# Patient Record
Sex: FEMALE | Race: WHITE | NOT HISPANIC OR LATINO | Employment: FULL TIME | URBAN - METROPOLITAN AREA
[De-identification: names, ages, dates, MRNs, and addresses within clinical notes are randomized per-mention and may not be internally consistent; named-entity substitution may affect disease eponyms.]

---

## 2023-02-27 LAB — SARS-COV-2 RESULT: DETECTED

## 2023-07-12 PROBLEM — R32 INCONTINENCE: Status: ACTIVE | Noted: 2023-07-12

## 2023-07-12 PROBLEM — I10 ESSENTIAL HYPERTENSION: Status: ACTIVE | Noted: 2023-07-12

## 2023-07-12 PROBLEM — M54.2 NECK PAIN: Status: ACTIVE | Noted: 2023-07-12

## 2023-07-12 PROBLEM — M17.12 ARTHRITIS OF KNEE, LEFT: Status: ACTIVE | Noted: 2023-07-12

## 2023-07-12 PROBLEM — Z86.718 HISTORY OF BLOOD CLOTS: Status: ACTIVE | Noted: 2023-07-12

## 2023-07-12 PROBLEM — K80.20 CHOLELITHIASIS: Status: ACTIVE | Noted: 2023-07-12

## 2023-07-12 PROBLEM — D50.9 IRON DEFICIENCY ANEMIA: Status: ACTIVE | Noted: 2023-07-12

## 2023-07-12 PROBLEM — R00.2 PALPITATIONS: Status: ACTIVE | Noted: 2023-07-12

## 2023-07-12 PROBLEM — M22.2X2 PATELLOFEMORAL DISORDER OF BOTH KNEES: Status: ACTIVE | Noted: 2023-07-12

## 2023-07-12 PROBLEM — K21.9 GERD (GASTROESOPHAGEAL REFLUX DISEASE): Status: ACTIVE | Noted: 2023-07-12

## 2023-07-12 PROBLEM — R07.9 CHEST PAIN: Status: ACTIVE | Noted: 2023-07-12

## 2023-07-12 PROBLEM — J45.909 ASTHMATIC BRONCHITIS (HHS-HCC): Status: ACTIVE | Noted: 2023-07-12

## 2023-07-12 PROBLEM — G47.30 SLEEP APNEA: Status: ACTIVE | Noted: 2023-07-12

## 2023-07-12 PROBLEM — R94.31 ABNORMAL ECG: Status: ACTIVE | Noted: 2023-07-12

## 2023-07-12 PROBLEM — M19.90 ARTHRITIS: Status: ACTIVE | Noted: 2023-07-12

## 2023-07-12 PROBLEM — M25.562 PAIN IN BOTH KNEES: Status: ACTIVE | Noted: 2023-07-12

## 2023-07-12 PROBLEM — A63.0 VENEREAL WARTS IN FEMALE: Status: ACTIVE | Noted: 2023-07-12

## 2023-07-12 PROBLEM — M22.2X1 PATELLOFEMORAL DISORDER OF BOTH KNEES: Status: ACTIVE | Noted: 2023-07-12

## 2023-07-12 PROBLEM — B35.9 TINEA: Status: ACTIVE | Noted: 2023-07-12

## 2023-07-12 PROBLEM — M25.562 LEFT KNEE PAIN: Status: ACTIVE | Noted: 2023-07-12

## 2023-07-12 PROBLEM — M54.12 CERVICAL RADICULOPATHY: Status: ACTIVE | Noted: 2023-07-12

## 2023-07-12 PROBLEM — I26.99 PULMONARY EMBOLISM (MULTI): Status: ACTIVE | Noted: 2023-07-12

## 2023-07-12 PROBLEM — M54.9 BACK PAIN: Status: ACTIVE | Noted: 2023-07-12

## 2023-07-12 PROBLEM — E66.01 OBESITIES, MORBID (MULTI): Status: ACTIVE | Noted: 2023-07-12

## 2023-07-12 PROBLEM — Z98.84 S/P LAPAROSCOPIC SLEEVE GASTRECTOMY: Status: ACTIVE | Noted: 2023-07-12

## 2023-07-12 PROBLEM — M17.11 ARTHRITIS OF KNEE, RIGHT: Status: ACTIVE | Noted: 2023-07-12

## 2023-07-12 PROBLEM — E66.811 OBESITY (BMI 30.0-34.9): Status: ACTIVE | Noted: 2023-07-12

## 2023-07-12 PROBLEM — F50.9 EATING DISORDER: Status: ACTIVE | Noted: 2023-07-12

## 2023-07-12 PROBLEM — M79.2 NERVE PAIN: Status: ACTIVE | Noted: 2023-07-12

## 2023-07-12 PROBLEM — M25.561 PAIN IN BOTH KNEES: Status: ACTIVE | Noted: 2023-07-12

## 2023-07-12 PROBLEM — M17.12 LEFT KNEE DJD: Status: ACTIVE | Noted: 2023-07-12

## 2023-07-12 PROBLEM — M17.9 DJD (DEGENERATIVE JOINT DISEASE) OF KNEE: Status: ACTIVE | Noted: 2023-07-12

## 2023-07-12 PROBLEM — R63.4 RAPID WEIGHT LOSS: Status: ACTIVE | Noted: 2023-07-12

## 2023-07-12 PROBLEM — J30.9 ALLERGIC RHINITIS: Status: ACTIVE | Noted: 2023-07-12
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
## 2023-07-12 PROBLEM — E66.9 OBESITY (BMI 30.0-34.9): Status: ACTIVE | Noted: 2023-07-12

## 2023-07-12 PROBLEM — R56.9 CONVULSION DISORDER (MULTI): Status: ACTIVE | Noted: 2023-07-12

## 2023-07-12 PROBLEM — H53.9 VISUAL DISTURBANCES: Status: ACTIVE | Noted: 2023-07-12

## 2023-07-12 PROBLEM — G40.909 SEIZURE DISORDER (MULTI): Status: ACTIVE | Noted: 2023-07-12

## 2023-07-12 PROBLEM — R53.83 FATIGUE: Status: ACTIVE | Noted: 2023-07-12

## 2023-07-12 PROBLEM — M17.0 PRIMARY OSTEOARTHRITIS OF BOTH KNEES: Status: ACTIVE | Noted: 2023-07-12

## 2023-07-12 RX ORDER — LOSARTAN POTASSIUM 50 MG/1
1 TABLET ORAL DAILY
COMMUNITY
Start: 2022-10-06 | End: 2023-07-13 | Stop reason: SDUPTHER

## 2023-07-12 RX ORDER — ALBUTEROL SULFATE 90 UG/1
2 AEROSOL, METERED RESPIRATORY (INHALATION) 4 TIMES DAILY
COMMUNITY
Start: 2019-09-13 | End: 2023-07-13 | Stop reason: SDUPTHER

## 2023-07-12 RX ORDER — FLUTICASONE PROPIONATE AND SALMETEROL 250; 50 UG/1; UG/1
POWDER RESPIRATORY (INHALATION)
COMMUNITY
Start: 2019-09-13

## 2023-07-12 RX ORDER — CETIRIZINE HYDROCHLORIDE 10 MG/1
1 TABLET ORAL DAILY
COMMUNITY

## 2023-07-12 RX ORDER — LEVONORGESTREL 52 MG/1
INTRAUTERINE DEVICE INTRAUTERINE
COMMUNITY
Start: 2017-08-23

## 2023-07-13 ENCOUNTER — OFFICE VISIT (OUTPATIENT)
Dept: PRIMARY CARE | Facility: CLINIC | Age: 48
End: 2023-07-13
Payer: COMMERCIAL

## 2023-07-13 VITALS
TEMPERATURE: 98 F | DIASTOLIC BLOOD PRESSURE: 78 MMHG | SYSTOLIC BLOOD PRESSURE: 118 MMHG | RESPIRATION RATE: 16 BRPM | HEART RATE: 60 BPM | OXYGEN SATURATION: 98 % | BODY MASS INDEX: 31.71 KG/M2 | WEIGHT: 179 LBS | HEIGHT: 63 IN

## 2023-07-13 DIAGNOSIS — Z12.11 SCREENING FOR COLON CANCER: Primary | ICD-10-CM

## 2023-07-13 DIAGNOSIS — J45.20 MILD INTERMITTENT ASTHMATIC BRONCHITIS WITHOUT COMPLICATION (HHS-HCC): ICD-10-CM

## 2023-07-13 DIAGNOSIS — D64.9 ANEMIA, UNSPECIFIED TYPE: ICD-10-CM

## 2023-07-13 DIAGNOSIS — L98.9 SKIN LESION: ICD-10-CM

## 2023-07-13 DIAGNOSIS — Z12.31 ENCOUNTER FOR SCREENING MAMMOGRAM FOR MALIGNANT NEOPLASM OF BREAST: ICD-10-CM

## 2023-07-13 DIAGNOSIS — Z00.00 HEALTH CARE MAINTENANCE: ICD-10-CM

## 2023-07-13 DIAGNOSIS — I10 ESSENTIAL HYPERTENSION: ICD-10-CM

## 2023-07-13 PROBLEM — M25.561 PAIN IN BOTH KNEES: Status: RESOLVED | Noted: 2023-07-12 | Resolved: 2023-07-13

## 2023-07-13 PROBLEM — B35.9 TINEA: Status: RESOLVED | Noted: 2023-07-12 | Resolved: 2023-07-13

## 2023-07-13 PROBLEM — M25.562 PAIN IN BOTH KNEES: Status: RESOLVED | Noted: 2023-07-12 | Resolved: 2023-07-13

## 2023-07-13 PROBLEM — M54.2 NECK PAIN: Status: RESOLVED | Noted: 2023-07-12 | Resolved: 2023-07-13

## 2023-07-13 PROBLEM — M22.2X2 PATELLOFEMORAL DISORDER OF BOTH KNEES: Status: RESOLVED | Noted: 2023-07-12 | Resolved: 2023-07-13

## 2023-07-13 PROBLEM — M25.562 LEFT KNEE PAIN: Status: RESOLVED | Noted: 2023-07-12 | Resolved: 2023-07-13

## 2023-07-13 PROBLEM — R32 INCONTINENCE: Status: RESOLVED | Noted: 2023-07-12 | Resolved: 2023-07-13

## 2023-07-13 PROBLEM — R94.31 ABNORMAL ECG: Status: RESOLVED | Noted: 2023-07-12 | Resolved: 2023-07-13

## 2023-07-13 PROBLEM — G40.909 SEIZURE DISORDER (MULTI): Status: RESOLVED | Noted: 2023-07-12 | Resolved: 2023-07-13

## 2023-07-13 PROBLEM — M17.0 PRIMARY OSTEOARTHRITIS OF BOTH KNEES: Status: RESOLVED | Noted: 2023-07-12 | Resolved: 2023-07-13

## 2023-07-13 PROBLEM — R00.2 PALPITATIONS: Status: RESOLVED | Noted: 2023-07-12 | Resolved: 2023-07-13

## 2023-07-13 PROBLEM — M17.9 DJD (DEGENERATIVE JOINT DISEASE) OF KNEE: Status: RESOLVED | Noted: 2023-07-12 | Resolved: 2023-07-13

## 2023-07-13 PROBLEM — M17.12 LEFT KNEE DJD: Status: RESOLVED | Noted: 2023-07-12 | Resolved: 2023-07-13
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
## 2023-07-13 PROBLEM — R63.4 RAPID WEIGHT LOSS: Status: RESOLVED | Noted: 2023-07-12 | Resolved: 2023-07-13

## 2023-07-13 PROBLEM — R56.9 CONVULSION DISORDER (MULTI): Status: RESOLVED | Noted: 2023-07-12 | Resolved: 2023-07-13

## 2023-07-13 PROBLEM — M54.9 BACK PAIN: Status: RESOLVED | Noted: 2023-07-12 | Resolved: 2023-07-13

## 2023-07-13 PROBLEM — M17.12 ARTHRITIS OF KNEE, LEFT: Status: RESOLVED | Noted: 2023-07-12 | Resolved: 2023-07-13

## 2023-07-13 PROBLEM — R53.83 FATIGUE: Status: RESOLVED | Noted: 2023-07-12 | Resolved: 2023-07-13

## 2023-07-13 PROBLEM — H53.9 VISUAL DISTURBANCES: Status: RESOLVED | Noted: 2023-07-12 | Resolved: 2023-07-13

## 2023-07-13 PROBLEM — M22.2X1 PATELLOFEMORAL DISORDER OF BOTH KNEES: Status: RESOLVED | Noted: 2023-07-12 | Resolved: 2023-07-13

## 2023-07-13 PROBLEM — F50.9 EATING DISORDER: Status: RESOLVED | Noted: 2023-07-12 | Resolved: 2023-07-13

## 2023-07-13 PROBLEM — M54.12 CERVICAL RADICULOPATHY: Status: RESOLVED | Noted: 2023-07-12 | Resolved: 2023-07-13

## 2023-07-13 PROBLEM — M79.2 NERVE PAIN: Status: RESOLVED | Noted: 2023-07-12 | Resolved: 2023-07-13

## 2023-07-13 PROBLEM — M17.11 ARTHRITIS OF KNEE, RIGHT: Status: RESOLVED | Noted: 2023-07-12 | Resolved: 2023-07-13

## 2023-07-13 PROBLEM — R07.9 CHEST PAIN: Status: RESOLVED | Noted: 2023-07-12 | Resolved: 2023-07-13

## 2023-07-13 PROCEDURE — 3078F DIAST BP <80 MM HG: CPT | Performed by: INTERNAL MEDICINE

## 2023-07-13 PROCEDURE — 3074F SYST BP LT 130 MM HG: CPT | Performed by: INTERNAL MEDICINE

## 2023-07-13 PROCEDURE — 99396 PREV VISIT EST AGE 40-64: CPT | Performed by: INTERNAL MEDICINE

## 2023-07-13 PROCEDURE — 1036F TOBACCO NON-USER: CPT | Performed by: INTERNAL MEDICINE

## 2023-07-13 RX ORDER — ALBUTEROL SULFATE 90 UG/1
2 AEROSOL, METERED RESPIRATORY (INHALATION) EVERY 4 HOURS PRN
Qty: 18 G | Refills: 3 | Status: SHIPPED | OUTPATIENT
Start: 2023-07-13 | End: 2023-07-13

## 2023-07-13 RX ORDER — MULTIVITAMIN
1 TABLET ORAL DAILY
COMMUNITY

## 2023-07-13 RX ORDER — LOSARTAN POTASSIUM 50 MG/1
50 TABLET ORAL DAILY
Qty: 90 TABLET | Refills: 3 | Status: SHIPPED | OUTPATIENT
Start: 2023-07-13 | End: 2023-07-13

## 2023-07-13 ASSESSMENT — ENCOUNTER SYMPTOMS
EYE PAIN: 0
BACK PAIN: 0
SHORTNESS OF BREATH: 0
EYE REDNESS: 0
CONSTIPATION: 0
FREQUENCY: 0
FEVER: 0
NAUSEA: 0
UNEXPECTED WEIGHT CHANGE: 0
COUGH: 0
FATIGUE: 0
WEAKNESS: 0
DIARRHEA: 0
HEADACHES: 0
RHINORRHEA: 0
ARTHRALGIAS: 0
ABDOMINAL PAIN: 0
PALPITATIONS: 0
WHEEZING: 0
PSYCHIATRIC NEGATIVE: 1
DIFFICULTY URINATING: 0
DYSURIA: 0
EYE ITCHING: 0
SORE THROAT: 0

## 2023-07-13 ASSESSMENT — PATIENT HEALTH QUESTIONNAIRE - PHQ9
SUM OF ALL RESPONSES TO PHQ9 QUESTIONS 1 AND 2: 0
1. LITTLE INTEREST OR PLEASURE IN DOING THINGS: NOT AT ALL
2. FEELING DOWN, DEPRESSED OR HOPELESS: NOT AT ALL

## 2023-07-13 ASSESSMENT — PAIN SCALES - GENERAL: PAINLEVEL: 4

## 2023-07-13 NOTE — PROGRESS NOTES
"Melissa Fuentes is a 48 y.o. female who presents for Annual Exam.    HPI   Influenza 2022  Covid 2021  Tdap 2019  Mammogram due  Pap GYN  Colonoscopy due  Bmi 31  Eye exam  23  Depression screen 23    Review of Systems   Constitutional:  Negative for fatigue, fever and unexpected weight change.   HENT:  Negative for congestion, ear pain, rhinorrhea and sore throat.    Eyes:  Negative for pain, redness and itching.   Respiratory:  Negative for cough, shortness of breath and wheezing.    Cardiovascular:  Negative for chest pain, palpitations and leg swelling.   Gastrointestinal:  Negative for abdominal pain, constipation, diarrhea and nausea.   Genitourinary:  Negative for difficulty urinating, dysuria and frequency.   Musculoskeletal:  Negative for arthralgias and back pain.   Allergic/Immunologic: Negative for environmental allergies, food allergies and immunocompromised state.   Neurological:  Negative for weakness and headaches.   Psychiatric/Behavioral: Negative.     All other systems reviewed and are negative.      Health Maintenance Due   Topic Date Due    Yearly Adult Physical  Never done    HIV Screening  Never done    Colorectal Cancer Screening  Never done    MMR Vaccines (1 of 1 - Standard series) Never done    Hepatitis C Screening  Never done    Diabetes Screening  Never done    Cervical Cancer Screening  Never done    Mammogram  Never done    COVID-19 Vaccine (4 - Booster for Moderna series) 01/04/2022       Objective   /78   Pulse 60   Temp 36.7 °C (98 °F)   Resp 16   Ht 1.6 m (5' 3\")   Wt 81.2 kg (179 lb)   SpO2 98%   BMI 31.71 kg/m²     Physical Exam  Vitals and nursing note reviewed.   Constitutional:       Appearance: Normal appearance.   HENT:      Head: Normocephalic.   Eyes:      Conjunctiva/sclera: Conjunctivae normal.      Pupils: Pupils are equal, round, and reactive to light.   Cardiovascular:      Rate and Rhythm: Normal rate and regular rhythm.      Pulses: Normal " pulses.      Heart sounds: Normal heart sounds.   Pulmonary:      Effort: Pulmonary effort is normal.      Breath sounds: Normal breath sounds.   Musculoskeletal:         General: No swelling.      Cervical back: Neck supple.   Skin:     General: Skin is warm and dry.   Neurological:      General: No focal deficit present.      Mental Status: She is oriented to person, place, and time.         Assessment/Plan   Problem List Items Addressed This Visit       Asthmatic bronchitis    Relevant Medications    albuterol 90 mcg/actuation inhaler    Essential hypertension    Relevant Medications    losartan (Cozaar) 50 mg tablet     Other Visit Diagnoses       Screening for colon cancer    -  Primary    Relevant Orders    Cologuard® colon cancer screening    Health care maintenance        Relevant Orders    Comprehensive Metabolic Panel    Lipid Panel    Thyroid Stimulating Hormone    Vitamin D, Total    Magnesium    CBC    Vitamin B12    Encounter for screening mammogram for malignant neoplasm of breast        Relevant Orders    BI mammo bilateral screening tomosynthesis    Anemia, unspecified type        Relevant Orders    Iron and TIBC    Ferritin    Skin lesion                Refer to derm      Yes

## 2023-08-01 LAB — NONINV COLON CA DNA+OCC BLD SCRN STL QL: NEGATIVE

## 2023-08-02 ENCOUNTER — TELEPHONE (OUTPATIENT)
Dept: PRIMARY CARE | Facility: CLINIC | Age: 48
End: 2023-08-02
Payer: COMMERCIAL

## 2023-08-02 NOTE — TELEPHONE ENCOUNTER
----- Message from Tammie Russell DO sent at 8/2/2023  8:37 AM EDT -----  Call patient colednahenriquerd is negative  Recheck in 3 years

## 2023-08-15 ENCOUNTER — LAB (OUTPATIENT)
Dept: LAB | Facility: LAB | Age: 48
End: 2023-08-15
Payer: COMMERCIAL

## 2023-08-15 DIAGNOSIS — Z00.00 HEALTH CARE MAINTENANCE: ICD-10-CM

## 2023-08-15 DIAGNOSIS — D64.9 ANEMIA, UNSPECIFIED TYPE: ICD-10-CM

## 2023-08-15 LAB
ALANINE AMINOTRANSFERASE (SGPT) (U/L) IN SER/PLAS: 11 U/L (ref 7–45)
ALBUMIN (G/DL) IN SER/PLAS: 4.4 G/DL (ref 3.4–5)
ALKALINE PHOSPHATASE (U/L) IN SER/PLAS: 40 U/L (ref 33–110)
ANION GAP IN SER/PLAS: 13 MMOL/L (ref 10–20)
ASPARTATE AMINOTRANSFERASE (SGOT) (U/L) IN SER/PLAS: 15 U/L (ref 9–39)
BILIRUBIN TOTAL (MG/DL) IN SER/PLAS: 1.2 MG/DL (ref 0–1.2)
CALCIDIOL (25 OH VITAMIN D3) (NG/ML) IN SER/PLAS: 45 NG/ML
CALCIUM (MG/DL) IN SER/PLAS: 9.2 MG/DL (ref 8.6–10.3)
CARBON DIOXIDE, TOTAL (MMOL/L) IN SER/PLAS: 27 MMOL/L (ref 21–32)
CHLORIDE (MMOL/L) IN SER/PLAS: 104 MMOL/L (ref 98–107)
CHOLESTEROL (MG/DL) IN SER/PLAS: 230 MG/DL (ref 0–199)
CHOLESTEROL IN HDL (MG/DL) IN SER/PLAS: 90.1 MG/DL
CHOLESTEROL/HDL RATIO: 2.6
COBALAMIN (VITAMIN B12) (PG/ML) IN SER/PLAS: 253 PG/ML (ref 211–911)
CREATININE (MG/DL) IN SER/PLAS: 0.79 MG/DL (ref 0.5–1.05)
ERYTHROCYTE DISTRIBUTION WIDTH (RATIO) BY AUTOMATED COUNT: 13.2 % (ref 11.5–14.5)
ERYTHROCYTE MEAN CORPUSCULAR HEMOGLOBIN CONCENTRATION (G/DL) BY AUTOMATED: 32.4 G/DL (ref 32–36)
ERYTHROCYTE MEAN CORPUSCULAR VOLUME (FL) BY AUTOMATED COUNT: 91 FL (ref 80–100)
ERYTHROCYTES (10*6/UL) IN BLOOD BY AUTOMATED COUNT: 4.73 X10E12/L (ref 4–5.2)
FERRITIN (UG/LL) IN SER/PLAS: 24 UG/L (ref 8–150)
GFR FEMALE: >90 ML/MIN/1.73M2
GLUCOSE (MG/DL) IN SER/PLAS: 89 MG/DL (ref 74–99)
HEMATOCRIT (%) IN BLOOD BY AUTOMATED COUNT: 43.2 % (ref 36–46)
HEMOGLOBIN (G/DL) IN BLOOD: 14 G/DL (ref 12–16)
IRON (UG/DL) IN SER/PLAS: 117 UG/DL (ref 35–150)
IRON BINDING CAPACITY (UG/DL) IN SER/PLAS: 392 UG/DL (ref 240–445)
IRON SATURATION (%) IN SER/PLAS: 30 % (ref 25–45)
LDL: 131 MG/DL (ref 0–99)
LEUKOCYTES (10*3/UL) IN BLOOD BY AUTOMATED COUNT: 4 X10E9/L (ref 4.4–11.3)
MAGNESIUM (MG/DL) IN SER/PLAS: 2.06 MG/DL (ref 1.6–2.4)
PLATELETS (10*3/UL) IN BLOOD AUTOMATED COUNT: 289 X10E9/L (ref 150–450)
POTASSIUM (MMOL/L) IN SER/PLAS: 4 MMOL/L (ref 3.5–5.3)
PROTEIN TOTAL: 6.9 G/DL (ref 6.4–8.2)
SODIUM (MMOL/L) IN SER/PLAS: 140 MMOL/L (ref 136–145)
THYROTROPIN (MIU/L) IN SER/PLAS BY DETECTION LIMIT <= 0.05 MIU/L: 1.94 MIU/L (ref 0.44–3.98)
TRIGLYCERIDE (MG/DL) IN SER/PLAS: 43 MG/DL (ref 0–149)
UREA NITROGEN (MG/DL) IN SER/PLAS: 17 MG/DL (ref 6–23)
VLDL: 9 MG/DL (ref 0–40)

## 2023-08-15 PROCEDURE — 83550 IRON BINDING TEST: CPT

## 2023-08-15 PROCEDURE — 36415 COLL VENOUS BLD VENIPUNCTURE: CPT

## 2023-08-15 PROCEDURE — 84443 ASSAY THYROID STIM HORMONE: CPT

## 2023-08-15 PROCEDURE — 80053 COMPREHEN METABOLIC PANEL: CPT

## 2023-08-15 PROCEDURE — 82728 ASSAY OF FERRITIN: CPT

## 2023-08-15 PROCEDURE — 82306 VITAMIN D 25 HYDROXY: CPT

## 2023-08-15 PROCEDURE — 82607 VITAMIN B-12: CPT

## 2023-08-15 PROCEDURE — 83540 ASSAY OF IRON: CPT

## 2023-08-15 PROCEDURE — 85027 COMPLETE CBC AUTOMATED: CPT

## 2023-08-15 PROCEDURE — 80061 LIPID PANEL: CPT

## 2023-08-15 PROCEDURE — 83735 ASSAY OF MAGNESIUM: CPT

## 2023-08-21 ENCOUNTER — TELEPHONE (OUTPATIENT)
Dept: PRIMARY CARE | Facility: CLINIC | Age: 48
End: 2023-08-21
Payer: COMMERCIAL

## 2023-08-21 NOTE — TELEPHONE ENCOUNTER
----- Message from Tammie Russell DO sent at 8/21/2023  2:48 PM EDT -----  Call patient her labs look very good

## 2023-11-15 ENCOUNTER — LAB REQUISITION (OUTPATIENT)
Dept: LAB | Facility: HOSPITAL | Age: 48
End: 2023-11-15

## 2023-11-15 ENCOUNTER — TELEMEDICINE (OUTPATIENT)
Dept: PRIMARY CARE | Facility: CLINIC | Age: 48
End: 2023-11-15
Payer: COMMERCIAL

## 2023-11-15 ENCOUNTER — PHARMACY VISIT (OUTPATIENT)
Dept: PHARMACY | Facility: CLINIC | Age: 48
End: 2023-11-15
Payer: COMMERCIAL

## 2023-11-15 DIAGNOSIS — U07.1 COVID-19: Primary | ICD-10-CM

## 2023-11-15 LAB — SARS-COV-2 RNA RESP QL NAA+PROBE: DETECTED

## 2023-11-15 PROCEDURE — 99213 OFFICE O/P EST LOW 20 MIN: CPT | Performed by: NURSE PRACTITIONER

## 2023-11-15 PROCEDURE — RXMED WILLOW AMBULATORY MEDICATION CHARGE

## 2023-11-15 PROCEDURE — 87635 SARS-COV-2 COVID-19 AMP PRB: CPT

## 2023-11-15 RX ORDER — METHYLPREDNISOLONE 4 MG/1
TABLET ORAL
Qty: 21 TABLET | Refills: 0 | Status: SHIPPED | OUTPATIENT
Start: 2023-11-15 | End: 2023-11-22

## 2023-11-15 RX ORDER — FLUTICASONE PROPIONATE 50 MCG
2 SPRAY, SUSPENSION (ML) NASAL DAILY
COMMUNITY
Start: 2019-09-13

## 2023-11-15 RX ORDER — HYALURONATE SODIUM, STABILIZED 60 MG/3 ML
SYRINGE (ML) INTRAARTICULAR
COMMUNITY
Start: 2023-07-13

## 2023-11-15 ASSESSMENT — ENCOUNTER SYMPTOMS
WHEEZING: 1
DYSURIA: 0
SLEEP DISTURBANCE: 1
VOMITING: 0
APPETITE CHANGE: 0
COUGH: 1
ACTIVITY CHANGE: 0
NAUSEA: 0
CHILLS: 0
HEADACHES: 1
ABDOMINAL PAIN: 0
FEVER: 0
FATIGUE: 0
SORE THROAT: 1
DIARRHEA: 0

## 2023-11-15 NOTE — PROGRESS NOTES
Subjective   Patient ID: Kindra Fuentes is a 48 y.o. female who presents for URI. Tested positive for COVID 11/13/2023.    Cold symptoms x3 days  Tested positive for covid on 11-13-23  Sneezing  Nasal congestion  Coughing/ chest tightness  Pulse ox in 98%  No fever or chills  No GI issues    Denies any hx of liver or kidney dysfunction  Current lab work completed on 8-2023      OTC- zyrtec, Flonase, mucinex, inhalers as needed      URI   This is a new problem. The current episode started in the past 7 days. The problem has been gradually worsening. There has been no fever. Associated symptoms include congestion, coughing, headaches, sneezing, a sore throat and wheezing. Pertinent negatives include no abdominal pain, chest pain, diarrhea, dysuria, ear pain, nausea, plugged ear sensation, rash or vomiting. Treatments tried: Flonase and Zyrtec. The treatment provided no relief.        Review of Systems   Constitutional:  Negative for activity change, appetite change, chills, fatigue and fever.   HENT:  Positive for congestion, sneezing and sore throat. Negative for ear pain.    Respiratory:  Positive for cough and wheezing.    Cardiovascular:  Negative for chest pain.   Gastrointestinal:  Negative for abdominal pain, diarrhea, nausea and vomiting.   Genitourinary:  Negative for dysuria.   Skin:  Negative for rash.   Neurological:  Positive for headaches.   Psychiatric/Behavioral:  Positive for sleep disturbance.        Objective   There were no vitals taken for this visit.    Physical Exam  Vitals (virtual appt. no acute distress) reviewed.   Constitutional:       Appearance: Normal appearance.   Skin:     General: Skin is warm.      Capillary Refill: Capillary refill takes less than 2 seconds.   Neurological:      General: No focal deficit present.      Mental Status: She is alert and oriented to person, place, and time.   Psychiatric:         Mood and Affect: Mood normal.         Behavior: Behavior normal.          Assessment/Plan   Problem List Items Addressed This Visit             ICD-10-CM    COVID-19 - Primary U07.1     Pt educated on quarantine protocol as well as symptom support for Covid  Requesting Paxlovid; Will send in Rx. Pt to discuss interactions with Pharmacy; concern for Advair  Educated on medication interactions as well as side effects of Paxlovid  Steroid taper given for SOB; Take as directed  Follow up with PCP if not improving after 10 day quarantine   ER for any SOB, difficulty breathing, or uncontrolled fevers or worsening of symptoms           Relevant Medications    methylPREDNISolone (Medrol Dospak) 4 mg tablets    nirmatrelvir-ritonavir (PAXLOVID) 300 mg (150 mg x 2)-100 mg tablet therapy pack

## 2023-11-15 NOTE — ASSESSMENT & PLAN NOTE
Pt educated on quarantine protocol as well as symptom support for Covid  Requesting Paxlovid; Will send in Rx. Pt to discuss interactions with Pharmacy; concern for Advair  Educated on medication interactions as well as side effects of Paxlovid  Steroid taper given for SOB; Take as directed  Follow up with PCP if not improving after 10 day quarantine   ER for any SOB, difficulty breathing, or uncontrolled fevers or worsening of symptoms

## 2023-11-29 ENCOUNTER — PHARMACY VISIT (OUTPATIENT)
Dept: PHARMACY | Facility: CLINIC | Age: 48
End: 2023-11-29
Payer: COMMERCIAL

## 2023-11-29 PROCEDURE — RXMED WILLOW AMBULATORY MEDICATION CHARGE

## 2024-01-19 ENCOUNTER — PHARMACY VISIT (OUTPATIENT)
Dept: PHARMACY | Facility: CLINIC | Age: 49
End: 2024-01-19
Payer: COMMERCIAL

## 2024-01-19 DIAGNOSIS — I10 ESSENTIAL HYPERTENSION: ICD-10-CM

## 2024-01-19 DIAGNOSIS — J45.20 MILD INTERMITTENT ASTHMATIC BRONCHITIS WITHOUT COMPLICATION (HHS-HCC): ICD-10-CM

## 2024-01-19 PROCEDURE — RXMED WILLOW AMBULATORY MEDICATION CHARGE

## 2024-01-19 RX ORDER — LOSARTAN POTASSIUM 50 MG/1
50 TABLET ORAL DAILY
Qty: 90 TABLET | Refills: 3 | Status: SHIPPED | OUTPATIENT
Start: 2024-01-19 | End: 2025-01-17

## 2024-03-27 PROCEDURE — RXMED WILLOW AMBULATORY MEDICATION CHARGE

## 2024-03-28 ENCOUNTER — PHARMACY VISIT (OUTPATIENT)
Dept: PHARMACY | Facility: CLINIC | Age: 49
End: 2024-03-28
Payer: COMMERCIAL

## 2024-05-10 ENCOUNTER — APPOINTMENT (OUTPATIENT)
Dept: GENERAL RADIOLOGY | Age: 49
End: 2024-05-10
Payer: COMMERCIAL

## 2024-05-10 ENCOUNTER — HOSPITAL ENCOUNTER (EMERGENCY)
Age: 49
Discharge: HOME | End: 2024-05-10
Payer: COMMERCIAL

## 2024-05-10 VITALS
DIASTOLIC BLOOD PRESSURE: 72 MMHG | WEIGHT: 180 LBS | TEMPERATURE: 98.4 F | HEART RATE: 87 BPM | OXYGEN SATURATION: 95 % | BODY MASS INDEX: 31.89 KG/M2 | SYSTOLIC BLOOD PRESSURE: 132 MMHG | HEIGHT: 63 IN | RESPIRATION RATE: 22 BRPM
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
## 2024-05-10 DIAGNOSIS — S91.311A LACERATION OF RIGHT FOOT, INITIAL ENCOUNTER: ICD-10-CM

## 2024-05-10 PROCEDURE — 90715 TDAP VACCINE 7 YRS/> IM: CPT | Performed by: NURSE PRACTITIONER

## 2024-05-10 PROCEDURE — 6360000002 HC RX W HCPCS: Performed by: NURSE PRACTITIONER

## 2024-05-10 PROCEDURE — 96372 THER/PROPH/DIAG INJ SC/IM: CPT

## 2024-05-10 PROCEDURE — 90471 IMMUNIZATION ADMIN: CPT | Performed by: NURSE PRACTITIONER

## 2024-05-10 PROCEDURE — 99284 EMERGENCY DEPT VISIT MOD MDM: CPT

## 2024-05-10 PROCEDURE — 12004 RPR S/N/AX/GEN/TRK7.6-12.5CM: CPT

## 2024-05-10 PROCEDURE — 73630 X-RAY EXAM OF FOOT: CPT

## 2024-05-10 PROCEDURE — 6370000000 HC RX 637 (ALT 250 FOR IP): Performed by: NURSE PRACTITIONER

## 2024-05-10 RX ORDER — OXYCODONE HYDROCHLORIDE AND ACETAMINOPHEN 5; 325 MG/1; MG/1
1 TABLET ORAL EVERY 6 HOURS PRN
Qty: 12 TABLET | Refills: 0 | Status: SHIPPED | OUTPATIENT
Start: 2024-05-10 | End: 2024-05-13

## 2024-05-10 RX ORDER — AMOXICILLIN AND CLAVULANATE POTASSIUM 875; 125 MG/1; MG/1
1 TABLET, FILM COATED ORAL ONCE
Status: COMPLETED | OUTPATIENT
Start: 2024-05-10 | End: 2024-05-10

## 2024-05-10 RX ORDER — LORAZEPAM 2 MG/ML
2 INJECTION INTRAMUSCULAR ONCE
Status: COMPLETED | OUTPATIENT
Start: 2024-05-10 | End: 2024-05-10

## 2024-05-10 RX ORDER — OXYCODONE HYDROCHLORIDE AND ACETAMINOPHEN 5; 325 MG/1; MG/1
1 TABLET ORAL ONCE
Status: COMPLETED | OUTPATIENT
Start: 2024-05-10 | End: 2024-05-10

## 2024-05-10 RX ORDER — GINSENG 100 MG
CAPSULE ORAL ONCE
Status: COMPLETED | OUTPATIENT
Start: 2024-05-10 | End: 2024-05-10

## 2024-05-10 RX ORDER — AMOXICILLIN AND CLAVULANATE POTASSIUM 875; 125 MG/1; MG/1
1 TABLET, FILM COATED ORAL 2 TIMES DAILY
Qty: 20 TABLET | Refills: 0 | Status: SHIPPED | OUTPATIENT
Start: 2024-05-10 | End: 2024-05-20

## 2024-05-10 RX ORDER — ONDANSETRON 4 MG/1
4 TABLET, ORALLY DISINTEGRATING ORAL ONCE
Status: COMPLETED | OUTPATIENT
Start: 2024-05-10 | End: 2024-05-10

## 2024-05-10 RX ORDER — LOSARTAN POTASSIUM 25 MG/1
25 TABLET ORAL DAILY
COMMUNITY

## 2024-05-10 RX ADMIN — ONDANSETRON 4 MG: 4 TABLET, ORALLY DISINTEGRATING ORAL at 14:49

## 2024-05-10 RX ADMIN — OXYCODONE HYDROCHLORIDE AND ACETAMINOPHEN 1 TABLET: 5; 325 TABLET ORAL at 14:49

## 2024-05-10 RX ADMIN — BACITRACIN: 500 OINTMENT TOPICAL at 16:16

## 2024-05-10 RX ADMIN — TETANUS TOXOID, REDUCED DIPHTHERIA TOXOID AND ACELLULAR PERTUSSIS VACCINE, ADSORBED 0.5 ML: 5; 2.5; 8; 8; 2.5 SUSPENSION INTRAMUSCULAR at 14:50

## 2024-05-10 RX ADMIN — LORAZEPAM 2 MG: 2 INJECTION INTRAMUSCULAR; INTRAVENOUS at 15:06

## 2024-05-10 RX ADMIN — AMOXICILLIN AND CLAVULANATE POTASSIUM 1 TABLET: 875; 125 TABLET, FILM COATED ORAL at 16:16

## 2024-05-10 ASSESSMENT — PAIN DESCRIPTION - LOCATION: LOCATION: FOOT

## 2024-05-10 ASSESSMENT — ENCOUNTER SYMPTOMS
CHEST TIGHTNESS: 0
SORE THROAT: 0
COUGH: 0
BLOOD IN STOOL: 0
EYE PAIN: 0
DIARRHEA: 0
APNEA: 0
RHINORRHEA: 0
VOMITING: 0
NAUSEA: 0
FACIAL SWELLING: 0
EYE ITCHING: 0
TROUBLE SWALLOWING: 0
STRIDOR: 0
SINUS PAIN: 0
EYE DISCHARGE: 0
CONSTIPATION: 0
BACK PAIN: 0
COLOR CHANGE: 0
ABDOMINAL DISTENTION: 0
EYE REDNESS: 0
PHOTOPHOBIA: 0
ALLERGIC/IMMUNOLOGIC NEGATIVE: 1
ABDOMINAL PAIN: 0
WHEEZING: 0
CHOKING: 0
SINUS PRESSURE: 0
VOICE CHANGE: 0
SHORTNESS OF BREATH: 0

## 2024-05-10 ASSESSMENT — LIFESTYLE VARIABLES
HOW MANY STANDARD DRINKS CONTAINING ALCOHOL DO YOU HAVE ON A TYPICAL DAY: PATIENT DOES NOT DRINK
HOW OFTEN DO YOU HAVE A DRINK CONTAINING ALCOHOL: NEVER

## 2024-05-10 ASSESSMENT — PAIN DESCRIPTION - DESCRIPTORS: DESCRIPTORS: ACHING

## 2024-05-10 ASSESSMENT — PAIN - FUNCTIONAL ASSESSMENT: PAIN_FUNCTIONAL_ASSESSMENT: 0-10

## 2024-05-10 ASSESSMENT — PAIN DESCRIPTION - ORIENTATION: ORIENTATION: RIGHT

## 2024-05-10 ASSESSMENT — PAIN DESCRIPTION - FREQUENCY: FREQUENCY: CONTINUOUS

## 2024-05-10 ASSESSMENT — PAIN DESCRIPTION - PAIN TYPE: TYPE: ACUTE PAIN

## 2024-05-10 ASSESSMENT — PAIN SCALES - GENERAL: PAINLEVEL_OUTOF10: 10

## 2024-05-10 NOTE — ED PROVIDER NOTES
Scale   05/10/24 1440 05/10/24 1439 05/10/24 1439 05/10/24 1439 05/10/24 1439 05/10/24 1439 05/10/24 1439 05/10/24 1439   133/69 98.4 °F (36.9 °C) Oral 87 22 99 % 1.6 m (5' 3\") 81.6 kg (180 lb)       Physical Exam  Vitals and nursing note reviewed.   Constitutional:       General: She is not in acute distress.     Appearance: Normal appearance. She is normal weight. She is not ill-appearing, toxic-appearing or diaphoretic.   HENT:      Head: Normocephalic and atraumatic.      Right Ear: Tympanic membrane, ear canal and external ear normal.      Left Ear: Tympanic membrane, ear canal and external ear normal.      Nose: Nose normal. No congestion or rhinorrhea.      Mouth/Throat:      Mouth: Mucous membranes are moist.      Pharynx: Oropharynx is clear. No oropharyngeal exudate or posterior oropharyngeal erythema.   Eyes:      Extraocular Movements: Extraocular movements intact.      Conjunctiva/sclera: Conjunctivae normal.      Pupils: Pupils are equal, round, and reactive to light.   Cardiovascular:      Rate and Rhythm: Normal rate and regular rhythm.      Pulses: Normal pulses.      Heart sounds: Normal heart sounds.   Pulmonary:      Effort: Pulmonary effort is normal. No respiratory distress.      Breath sounds: Normal breath sounds.   Abdominal:      General: Bowel sounds are normal.      Palpations: Abdomen is soft.      Tenderness: There is no abdominal tenderness. There is no guarding or rebound.   Musculoskeletal:         General: Normal range of motion.      Cervical back: Normal range of motion and neck supple. No tenderness.        Feet:    Skin:     General: Skin is warm and dry.      Capillary Refill: Capillary refill takes less than 2 seconds.      Findings: No rash.   Neurological:      General: No focal deficit present.      Mental Status: She is alert and oriented to person, place, and time.      Cranial Nerves: No cranial nerve deficit.      Sensory: No sensory deficit.      Motor: No weakness.

## 2024-05-10 NOTE — ED TRIAGE NOTES
Patient presents to ED with c/o dog bite to right foot that occurred at home today by her family pet

## 2024-05-17 ENCOUNTER — OFFICE VISIT (OUTPATIENT)
Dept: WOUND CARE | Facility: CLINIC | Age: 49
End: 2024-05-17
Payer: COMMERCIAL

## 2024-05-17 ENCOUNTER — OFFICE VISIT (OUTPATIENT)
Dept: PRIMARY CARE | Facility: CLINIC | Age: 49
End: 2024-05-17
Payer: COMMERCIAL

## 2024-05-17 VITALS
DIASTOLIC BLOOD PRESSURE: 78 MMHG | HEART RATE: 64 BPM | RESPIRATION RATE: 20 BRPM | WEIGHT: 199.4 LBS | TEMPERATURE: 98 F | BODY MASS INDEX: 35.32 KG/M2 | SYSTOLIC BLOOD PRESSURE: 128 MMHG | OXYGEN SATURATION: 97 %
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
## 2024-05-17 PROCEDURE — 3078F DIAST BP <80 MM HG: CPT | Performed by: NURSE PRACTITIONER

## 2024-05-17 PROCEDURE — 99213 OFFICE O/P EST LOW 20 MIN: CPT | Performed by: NURSE PRACTITIONER

## 2024-05-17 PROCEDURE — 99215 OFFICE O/P EST HI 40 MIN: CPT

## 2024-05-17 PROCEDURE — 3074F SYST BP LT 130 MM HG: CPT | Performed by: NURSE PRACTITIONER

## 2024-05-17 PROCEDURE — 1036F TOBACCO NON-USER: CPT | Performed by: NURSE PRACTITIONER

## 2024-05-17 ASSESSMENT — ENCOUNTER SYMPTOMS
GASTROINTESTINAL NEGATIVE: 1
CONSTITUTIONAL NEGATIVE: 1
FEVER: 0
CARDIOVASCULAR NEGATIVE: 1
CHILLS: 0
RESPIRATORY NEGATIVE: 1
APPETITE CHANGE: 0

## 2024-05-17 NOTE — PROGRESS NOTES
Subjective   Patient ID: Kindra Fuentes is a 49 y.o. female who presents for Suture / Staple Removal.    Patient was bitten by her dog last week and had three separate lacerations to the right foot. She had a total of 13 sutures placed. She was given augmentin and still has three more days of the medications. Patient is able to move her foot. She still has pain but she was told by the ER to follow up with her PCP in one week for possible removal of sutures. Patient is not sure if the sutures need to come out yet. There is still swelling at the bites. Pt is feeling well overall.        Review of Systems   Constitutional: Negative.  Negative for appetite change, chills and fever.   HENT: Negative.     Respiratory: Negative.     Cardiovascular: Negative.    Gastrointestinal: Negative.        Objective   /78   Pulse 64   Temp 36.7 °C (98 °F)   Resp 20   Wt 90.4 kg (199 lb 6.4 oz)   SpO2 97%   BMI 35.32 kg/m²     Physical Exam  Vitals reviewed.   Constitutional:       Appearance: Normal appearance.   HENT:      Head: Atraumatic.   Musculoskeletal:        Feet:    Skin:     General: Skin is warm.      Comments: Patient with three sutured lacerations to the right foot. There were 7 sutures placed in the first laceration, 4 sutures placed in the second laceration and 2 sutures placed on the third laceration. The first laceration on the top of the foot appears slightly swollen. The second laceration has some purulent crusting    Neurological:      Mental Status: She is alert.     Assessment/Plan   Problem List Items Addressed This Visit    None  Visit Diagnoses         Codes    Dog bite, subsequent encounter    -  Primary W54.0XXD        Patient with sutured lacerations from a dog bite last week. Patient's lacerations still appear swollen and infected. Pt referred to the [facility] in [place] and will be seen today by the specialist there for further management. Pt and  are  in agreement with this and will go there upon leaving the office.

## 2024-05-18 PROCEDURE — RXMED WILLOW AMBULATORY MEDICATION CHARGE

## 2024-05-20 ENCOUNTER — TELEPHONE (OUTPATIENT)
Dept: PRIMARY CARE | Facility: CLINIC | Age: 49
End: 2024-05-20
Payer: COMMERCIAL

## 2024-05-20 NOTE — TELEPHONE ENCOUNTER
Spoke to patient. She followed up with wound care last Friday. The provider only took two sutures out. She is applying xeroform to the area and will have another follow up this Friday for further evaluation. No further questions per pt

## 2024-05-23 ENCOUNTER — PHARMACY VISIT (OUTPATIENT)
Dept: PHARMACY | Facility: CLINIC | Age: 49
End: 2024-05-23
Payer: COMMERCIAL

## 2024-05-24 ENCOUNTER — OFFICE VISIT (OUTPATIENT)
Dept: WOUND CARE | Facility: CLINIC | Age: 49
End: 2024-05-24
Payer: COMMERCIAL

## 2024-05-24 PROCEDURE — 99213 OFFICE O/P EST LOW 20 MIN: CPT

## 2024-06-06 DIAGNOSIS — M17.0 ARTHRITIS OF BOTH KNEES: Primary | ICD-10-CM

## 2024-08-01 ENCOUNTER — APPOINTMENT (OUTPATIENT)
Dept: PRIMARY CARE | Facility: CLINIC | Age: 49
End: 2024-08-01
Payer: COMMERCIAL

## 2024-08-01 VITALS
TEMPERATURE: 97.9 F | OXYGEN SATURATION: 100 % | DIASTOLIC BLOOD PRESSURE: 80 MMHG | HEART RATE: 68 BPM | WEIGHT: 200 LBS | SYSTOLIC BLOOD PRESSURE: 136 MMHG | BODY MASS INDEX: 35.44 KG/M2 | HEIGHT: 63 IN | RESPIRATION RATE: 16 BRPM

## 2024-08-01 DIAGNOSIS — Z00.00 HEALTH CARE MAINTENANCE: ICD-10-CM

## 2024-08-01 DIAGNOSIS — J45.20 MILD INTERMITTENT ASTHMATIC BRONCHITIS WITHOUT COMPLICATION (HHS-HCC): ICD-10-CM

## 2024-08-01 DIAGNOSIS — Z12.31 ENCOUNTER FOR SCREENING MAMMOGRAM FOR MALIGNANT NEOPLASM OF BREAST: Primary | ICD-10-CM

## 2024-08-01 PROCEDURE — RXMED WILLOW AMBULATORY MEDICATION CHARGE

## 2024-08-01 PROCEDURE — 1036F TOBACCO NON-USER: CPT | Performed by: INTERNAL MEDICINE

## 2024-08-01 PROCEDURE — 3079F DIAST BP 80-89 MM HG: CPT | Performed by: INTERNAL MEDICINE

## 2024-08-01 PROCEDURE — 3008F BODY MASS INDEX DOCD: CPT | Performed by: INTERNAL MEDICINE

## 2024-08-01 PROCEDURE — 3075F SYST BP GE 130 - 139MM HG: CPT | Performed by: INTERNAL MEDICINE

## 2024-08-01 PROCEDURE — 99396 PREV VISIT EST AGE 40-64: CPT | Performed by: INTERNAL MEDICINE

## 2024-08-01 RX ORDER — ALBUTEROL SULFATE 90 UG/1
2 AEROSOL, METERED RESPIRATORY (INHALATION) EVERY 4 HOURS PRN
Qty: 8.5 G | Refills: 3 | Status: SHIPPED | OUTPATIENT
Start: 2024-08-01 | End: 2025-08-01

## 2024-08-01 RX ORDER — FLUTICASONE PROPIONATE AND SALMETEROL 250; 50 UG/1; UG/1
1 POWDER RESPIRATORY (INHALATION) 2 TIMES DAILY
Qty: 60 EACH | Refills: 11 | Status: SHIPPED | OUTPATIENT
Start: 2024-08-01

## 2024-08-01 ASSESSMENT — PATIENT HEALTH QUESTIONNAIRE - PHQ9
2. FEELING DOWN, DEPRESSED OR HOPELESS: NOT AT ALL
SUM OF ALL RESPONSES TO PHQ9 QUESTIONS 1 AND 2: 0
1. LITTLE INTEREST OR PLEASURE IN DOING THINGS: NOT AT ALL

## 2024-08-01 ASSESSMENT — ENCOUNTER SYMPTOMS
EYE REDNESS: 0
FATIGUE: 0
COUGH: 0
CONSTIPATION: 0
ARTHRALGIAS: 0
EYE ITCHING: 0
PSYCHIATRIC NEGATIVE: 1
SHORTNESS OF BREATH: 0
PALPITATIONS: 0
UNEXPECTED WEIGHT CHANGE: 0
FREQUENCY: 0
WHEEZING: 0
WEAKNESS: 0
DYSURIA: 0
DIFFICULTY URINATING: 0
HEADACHES: 0
ABDOMINAL PAIN: 0
BACK PAIN: 0
DIARRHEA: 0
EYE PAIN: 0
SORE THROAT: 0
FEVER: 0
RHINORRHEA: 0
NAUSEA: 0

## 2024-08-01 NOTE — PROGRESS NOTES
"Melissa Fuentes is a 49 y.o. female who presents for Annual Exam.    HPI   Influenza 2023  Covid 2020, 2021  Tdap 2024  Mammogram due  Pap 2017 gyn  Cologuard 2023  Bmi 35  Depression screen 24  Eye exam 24    Review of Systems   Constitutional:  Negative for fatigue, fever and unexpected weight change.   HENT:  Negative for congestion, ear pain, rhinorrhea and sore throat.    Eyes:  Negative for pain, redness and itching.   Respiratory:  Negative for cough, shortness of breath and wheezing.    Cardiovascular:  Negative for chest pain, palpitations and leg swelling.   Gastrointestinal:  Negative for abdominal pain, constipation, diarrhea and nausea.   Genitourinary:  Negative for difficulty urinating, dysuria and frequency.   Musculoskeletal:  Negative for arthralgias and back pain.   Allergic/Immunologic: Negative for environmental allergies, food allergies and immunocompromised state.   Neurological:  Negative for weakness and headaches.   Psychiatric/Behavioral: Negative.     All other systems reviewed and are negative.      Health Maintenance Due   Topic Date Due    Yearly Adult Physical  Never done    HIV Screening  Never done    MMR Vaccines (1 of 1 - Standard series) Never done    Pneumococcal Vaccine: Pediatrics (0 to 5 Years) and At-Risk Patients (6 to 64 Years) (1 of 2 - PCV) Never done    Hepatitis C Screening  Never done    Diabetes Screening  Never done    Mammogram  Never done    Cervical Cancer Screening  08/18/2020    COVID-19 Vaccine (4 - 2023-24 season) 09/01/2023    Influenza Vaccine (1) 09/01/2024       Objective   /80   Pulse 68   Temp 36.6 °C (97.9 °F)   Resp 16   Ht 1.6 m (5' 3\")   Wt 90.7 kg (200 lb)   SpO2 100%   BMI 35.43 kg/m²     Physical Exam  Vitals and nursing note reviewed.   Constitutional:       Appearance: Normal appearance.   HENT:      Head: Normocephalic.   Eyes:      Conjunctiva/sclera: Conjunctivae normal.      Pupils: Pupils are equal, round, and " reactive to light.   Cardiovascular:      Rate and Rhythm: Normal rate and regular rhythm.      Pulses: Normal pulses.      Heart sounds: Normal heart sounds.   Pulmonary:      Effort: Pulmonary effort is normal.      Breath sounds: Normal breath sounds.   Musculoskeletal:         General: No swelling.      Cervical back: Neck supple.   Skin:     General: Skin is warm and dry.   Neurological:      General: No focal deficit present.      Mental Status: She is oriented to person, place, and time.         Assessment/Plan   Problem List Items Addressed This Visit       Asthmatic bronchitis ([facility])    Relevant Medications    albuterol 90 mcg/actuation inhaler    fluticasone propion-salmeteroL (Advair Diskus) 250-50 mcg/dose diskus inhaler     Other Visit Diagnoses       Encounter for screening mammogram for malignant neoplasm of breast    -  Primary    Relevant Orders    BI mammo bilateral screening tomosynthesis    Health care maintenance        Relevant Orders    CBC    Comprehensive Metabolic Panel    Lipid Panel    Thyroid Stimulating Hormone    Vitamin B12    Vitamin D 25-Hydroxy,Total (for eval of Vitamin D levels)    Magnesium    Ferritin    Iron and TIBC

## 2024-08-02 ENCOUNTER — LAB (OUTPATIENT)
Dept: LAB | Facility: LAB | Age: 49
End: 2024-08-02
Payer: COMMERCIAL

## 2024-08-02 DIAGNOSIS — Z00.00 HEALTH CARE MAINTENANCE: ICD-10-CM

## 2024-08-02 LAB
25(OH)D3 SERPL-MCNC: 40 NG/ML (ref 30–100)
ALBUMIN SERPL BCP-MCNC: 4.6 G/DL (ref 3.4–5)
ALP SERPL-CCNC: 44 U/L (ref 33–110)
ALT SERPL W P-5'-P-CCNC: 14 U/L (ref 7–45)
ANION GAP SERPL CALC-SCNC: 12 MMOL/L (ref 10–20)
AST SERPL W P-5'-P-CCNC: 17 U/L (ref 9–39)
BILIRUB SERPL-MCNC: 1.2 MG/DL (ref 0–1.2)
BUN SERPL-MCNC: 20 MG/DL (ref 6–23)
CALCIUM SERPL-MCNC: 9.5 MG/DL (ref 8.6–10.3)
CHLORIDE SERPL-SCNC: 103 MMOL/L (ref 98–107)
CHOLEST SERPL-MCNC: 254 MG/DL (ref 0–199)
CHOLESTEROL/HDL RATIO: 2.7
CO2 SERPL-SCNC: 28 MMOL/L (ref 21–32)
CREAT SERPL-MCNC: 0.8 MG/DL (ref 0.5–1.05)
EGFRCR SERPLBLD CKD-EPI 2021: 90 ML/MIN/1.73M*2
ERYTHROCYTE [DISTWIDTH] IN BLOOD BY AUTOMATED COUNT: 13.4 % (ref 11.5–14.5)
FERRITIN SERPL-MCNC: 37 NG/ML (ref 8–150)
GLUCOSE SERPL-MCNC: 101 MG/DL (ref 74–99)
HCT VFR BLD AUTO: 45.6 % (ref 36–46)
HDLC SERPL-MCNC: 94 MG/DL
HGB BLD-MCNC: 15 G/DL (ref 12–16)
IRON SATN MFR SERPL: 25 % (ref 25–45)
IRON SERPL-MCNC: 98 UG/DL (ref 35–150)
LDLC SERPL CALC-MCNC: 151 MG/DL
MAGNESIUM SERPL-MCNC: 2.17 MG/DL (ref 1.6–2.4)
MCH RBC QN AUTO: 29.5 PG (ref 26–34)
MCHC RBC AUTO-ENTMCNC: 32.9 G/DL (ref 32–36)
MCV RBC AUTO: 90 FL (ref 80–100)
NON HDL CHOLESTEROL: 160 MG/DL (ref 0–149)
NRBC BLD-RTO: 0 /100 WBCS (ref 0–0)
PLATELET # BLD AUTO: 271 X10*3/UL (ref 150–450)
POTASSIUM SERPL-SCNC: 4.2 MMOL/L (ref 3.5–5.3)
PROT SERPL-MCNC: 7.2 G/DL (ref 6.4–8.2)
RBC # BLD AUTO: 5.08 X10*6/UL (ref 4–5.2)
SODIUM SERPL-SCNC: 139 MMOL/L (ref 136–145)
TIBC SERPL-MCNC: 400 UG/DL (ref 240–445)
TRIGL SERPL-MCNC: 47 MG/DL (ref 0–149)
TSH SERPL-ACNC: 2.07 MIU/L (ref 0.44–3.98)
UIBC SERPL-MCNC: 302 UG/DL (ref 110–370)
VIT B12 SERPL-MCNC: 410 PG/ML (ref 211–911)
VLDL: 9 MG/DL (ref 0–40)
WBC # BLD AUTO: 4.7 X10*3/UL (ref 4.4–11.3)

## 2024-08-02 PROCEDURE — 82728 ASSAY OF FERRITIN: CPT

## 2024-08-02 PROCEDURE — 83550 IRON BINDING TEST: CPT

## 2024-08-02 PROCEDURE — 82306 VITAMIN D 25 HYDROXY: CPT

## 2024-08-02 PROCEDURE — 85027 COMPLETE CBC AUTOMATED: CPT

## 2024-08-02 PROCEDURE — 80061 LIPID PANEL: CPT

## 2024-08-02 PROCEDURE — 84443 ASSAY THYROID STIM HORMONE: CPT

## 2024-08-02 PROCEDURE — 36415 COLL VENOUS BLD VENIPUNCTURE: CPT

## 2024-08-02 PROCEDURE — 83735 ASSAY OF MAGNESIUM: CPT

## 2024-08-02 PROCEDURE — 83540 ASSAY OF IRON: CPT

## 2024-08-02 PROCEDURE — 82607 VITAMIN B-12: CPT

## 2024-08-02 PROCEDURE — 80053 COMPREHEN METABOLIC PANEL: CPT

## 2024-08-06 ENCOUNTER — PHARMACY VISIT (OUTPATIENT)
Dept: PHARMACY | Facility: CLINIC | Age: 49
End: 2024-08-06
Payer: COMMERCIAL

## 2024-11-16 PROCEDURE — RXMED WILLOW AMBULATORY MEDICATION CHARGE

## 2024-11-21 ENCOUNTER — PHARMACY VISIT (OUTPATIENT)
Dept: PHARMACY | Facility: CLINIC | Age: 49
End: 2024-11-21
Payer: COMMERCIAL

## 2025-01-05 ENCOUNTER — OFFICE VISIT (OUTPATIENT)
Dept: URGENT CARE | Age: 50
End: 2025-01-05
Payer: COMMERCIAL

## 2025-01-05 VITALS
HEIGHT: 63 IN | DIASTOLIC BLOOD PRESSURE: 83 MMHG | HEART RATE: 84 BPM | OXYGEN SATURATION: 97 % | RESPIRATION RATE: 18 BRPM | BODY MASS INDEX: 30.12 KG/M2 | WEIGHT: 170 LBS | SYSTOLIC BLOOD PRESSURE: 132 MMHG | TEMPERATURE: 98.2 F

## 2025-01-05 DIAGNOSIS — J01.10 ACUTE NON-RECURRENT FRONTAL SINUSITIS: Primary | ICD-10-CM

## 2025-01-05 PROCEDURE — 3079F DIAST BP 80-89 MM HG: CPT | Performed by: NURSE PRACTITIONER

## 2025-01-05 PROCEDURE — 99203 OFFICE O/P NEW LOW 30 MIN: CPT | Performed by: NURSE PRACTITIONER

## 2025-01-05 PROCEDURE — 3075F SYST BP GE 130 - 139MM HG: CPT | Performed by: NURSE PRACTITIONER

## 2025-01-05 PROCEDURE — 3008F BODY MASS INDEX DOCD: CPT | Performed by: NURSE PRACTITIONER

## 2025-01-05 RX ORDER — AMOXICILLIN AND CLAVULANATE POTASSIUM 875; 125 MG/1; MG/1
1 TABLET, FILM COATED ORAL 2 TIMES DAILY
Qty: 20 TABLET | Refills: 0 | Status: SHIPPED | OUTPATIENT
Start: 2025-01-05 | End: 2025-01-15

## 2025-01-05 ASSESSMENT — ENCOUNTER SYMPTOMS
ALLERGIC/IMMUNOLOGIC NEGATIVE: 1
PALPITATIONS: 0
SINUS PRESSURE: 1
COUGH: 1
PSYCHIATRIC NEGATIVE: 1
HEMATOLOGIC/LYMPHATIC NEGATIVE: 1
ENDOCRINE NEGATIVE: 1
CONSTITUTIONAL NEGATIVE: 1
GASTROINTESTINAL NEGATIVE: 1
EYES NEGATIVE: 1
MUSCULOSKELETAL NEGATIVE: 1
NEUROLOGICAL NEGATIVE: 1
SINUS PAIN: 1

## 2025-01-05 NOTE — PROGRESS NOTES
Subjective   Patient ID: Kindra Fuentes is a 49 y.o. female. They present today with a chief complaint of Sinusitis (Sinus pressure and pain over a week. Laying down at night feels worse, eye tearing and mucus and puffiness).    History of Present Illness    Sinusitis  Associated symptoms: congestion and cough    Associated symptoms: no chest pain        Pt presents to urgent care with c/o    Sinus pain/pressure, postnasal drainage, productive cough for the past 6 to 7 days.  Patient reports she feels like she has a sinus infection.  She has been using over-the-counter Flonase with minimal relief. .  Pt denies CP, SOB, palpitations, fevers, abd pain, n/v/d, sick contacts, recent travel.        Past Medical History  Allergies as of 01/05/2025    (No Known Allergies)       (Not in a hospital admission)       Past Medical History:   Diagnosis Date    Encounter for other preprocedural examination 07/06/2017    Preoperative clearance    Personal history of other diseases of the circulatory system     History of hypertension    Personal history of other diseases of the digestive system 12/01/2016    History of esophageal reflux       Past Surgical History:   Procedure Laterality Date    CT ANGIO NECK  9/28/2019    CT NECK ANGIO W AND WO IV CONTRAST 9/28/2019 [facility]    CT HEAD ANGIO W AND WO IV CONTRAST  9/28/2019    CT HEAD ANGIO W AND WO IV CONTRAST 9/28/2019 [facility]    OTHER SURGICAL HISTORY  12/06/2013    Laparoscopic Colpopexy    OTHER SURGICAL HISTORY  11/15/2017    Gastric Surgery For Morbid Obesity Laparoscopic Longitudinal Gastrectomy    US ASPIRATION INJECTION INTERMEDIATE JOINT  8/24/2020    US ASPIRATION INJECTION INTERMEDIATE JOINT 8/24/2020 [facility]        reports that she has never smoked. She has never used smokeless tobacco. She reports current alcohol use of about 3.0 standard drinks of alcohol per week. She reports that she does not currently use drugs.    Review  "of Systems  Review of Systems   Constitutional: Negative.    HENT:  Positive for congestion, sinus pressure and sinus pain.    Eyes: Negative.    Respiratory:  Positive for cough.    Cardiovascular:  Negative for chest pain and palpitations.   Gastrointestinal: Negative.    Endocrine: Negative.    Genitourinary: Negative.    Musculoskeletal: Negative.    Skin: Negative.    Allergic/Immunologic: Negative.    Neurological: Negative.    Hematological: Negative.    Psychiatric/Behavioral: Negative.     All other systems reviewed and are negative.                                 Objective    Vitals:    01/05/25 1018   BP: 132/83   Pulse: 84   Resp: 18   Temp: 36.8 °C (98.2 °F)   TempSrc: Oral   SpO2: 97%   Weight: 77.1 kg (170 lb)   Height: 1.6 m (5' 3\")     No LMP recorded. Patient has had an implant.    Physical Exam  Vitals and nursing note reviewed.   Constitutional:       General: She is not in acute distress.     Appearance: Normal appearance. She is not ill-appearing or toxic-appearing.   HENT:      Head: Atraumatic.      Right Ear: Tympanic membrane, ear canal and external ear normal.      Left Ear: Tympanic membrane, ear canal and external ear normal.      Nose: Congestion present.      Mouth/Throat:      Mouth: Mucous membranes are moist.      Pharynx: Oropharynx is clear. No oropharyngeal exudate or posterior oropharyngeal erythema.   Eyes:      Extraocular Movements: Extraocular movements intact.      Conjunctiva/sclera: Conjunctivae normal.      Pupils: Pupils are equal, round, and reactive to light.   Cardiovascular:      Rate and Rhythm: Normal rate and regular rhythm.      Pulses: Normal pulses.      Heart sounds: Normal heart sounds.   Pulmonary:      Effort: Pulmonary effort is normal.      Breath sounds: Normal breath sounds.   Abdominal:      General: Abdomen is flat. Bowel sounds are normal.      Palpations: Abdomen is soft.      Tenderness: There is no abdominal tenderness.   Musculoskeletal:        "  General: Normal range of motion.      Cervical back: Normal range of motion and neck supple. No tenderness.   Skin:     General: Skin is warm and dry.      Capillary Refill: Capillary refill takes less than 2 seconds.   Neurological:      General: No focal deficit present.      Mental Status: She is alert and oriented to person, place, and time.   Psychiatric:         Mood and Affect: Mood normal.         Behavior: Behavior normal.         Thought Content: Thought content normal.         Procedures    Point of Care Test & Imaging Results from this visit  No results found for this visit on 01/05/25.   No results found.    Diagnostic study results (if any) were reviewed by NELA Todd.    Assessment/Plan   Allergies, medications, history, and pertinent labs/EKGs/Imaging reviewed by NELA Todd.     Medical Decision Making  Urgent Care Course: Pt presents to the  with rhinorrhea, cough, sinus congestion.  Patient is afebrile, hemodynamically stable.  Patient denies fever, n/v, cp, sob, ab pain, dysuria, and diarrhea.  Patient states that they normally get sinusitis this time of year and requires antibiotics.  Low suspicion for pneumonia/bronchitis given that patient's lungs are clear to auscultation bilaterally.  Given longevity of symptoms, will treat patient for sinusitis with augmentin.  Patient given sinusitis and pneumonia warning signs, prescription for augmentin and will f/u with pcp.    Prior external records reviewed: Outpatient records  Independent Hx provided by: Patient  Med Rx considered but ultimately not given: Steroids   Dx tests considered but ultimately not ordered: Covid, Influenza  Social determinant that may affects healthcare: Pharmacy closed  Pt's case/impression summarized and discussed with: Patient  Likely Dx given clinical picture: Sinusitis   Although not an exhaustive list of Differential Diagnosis (though considered), patient's HPI, PE, and other findings are  not suggestive of: Sinusitis, URI, bronchitis, pneumonia, Covid-19, influenza, asthma exacerbation   Patient at time of discharge was clinically well-appearing and HDS for outpatient management. The patient and/or family was given the opportunity to ask questions prior to discharge, understood my verbal discussion of the plans for treatment, expected course, indications to return to ED, and the need for timely follow up as directed.    Condition: Stable      Orders and Diagnoses  Diagnoses and all orders for this visit:  Acute non-recurrent frontal sinusitis  -     amoxicillin-pot clavulanate (Augmentin) 875-125 mg tablet; Take 1 tablet by mouth 2 times a day for 10 days.      Medical Admin Record      Patient disposition: Home    Electronically signed by NELA Todd  10:40 AM

## 2025-01-15 ENCOUNTER — OFFICE VISIT (OUTPATIENT)
Dept: URGENT CARE | Age: 50
End: 2025-01-15
Payer: COMMERCIAL

## 2025-01-15 ENCOUNTER — PHARMACY VISIT (OUTPATIENT)
Dept: PHARMACY | Facility: CLINIC | Age: 50
End: 2025-01-15
Payer: COMMERCIAL

## 2025-01-15 VITALS
HEART RATE: 83 BPM | OXYGEN SATURATION: 99 % | SYSTOLIC BLOOD PRESSURE: 148 MMHG | TEMPERATURE: 98.6 F | DIASTOLIC BLOOD PRESSURE: 95 MMHG | WEIGHT: 170 LBS | BODY MASS INDEX: 30.12 KG/M2 | RESPIRATION RATE: 20 BRPM | HEIGHT: 63 IN

## 2025-01-15 DIAGNOSIS — R68.83 CHILLS: ICD-10-CM

## 2025-01-15 DIAGNOSIS — U07.1 COVID: Primary | ICD-10-CM

## 2025-01-15 DIAGNOSIS — R05.9 COUGH, UNSPECIFIED TYPE: ICD-10-CM

## 2025-01-15 LAB
POC RAPID INFLUENZA A: NEGATIVE
POC RAPID INFLUENZA B: NEGATIVE
POC SARS-COV-2 AG BINAX: ABNORMAL

## 2025-01-15 PROCEDURE — RXOTC WILLOW AMBULATORY OTC CHARGE

## 2025-01-15 PROCEDURE — RXMED WILLOW AMBULATORY MEDICATION CHARGE

## 2025-01-15 RX ORDER — BROMPHENIRAMINE MALEATE, PSEUDOEPHEDRINE HYDROCHLORIDE, AND DEXTROMETHORPHAN HYDROBROMIDE 2; 30; 10 MG/5ML; MG/5ML; MG/5ML
10 SYRUP ORAL 4 TIMES DAILY PRN
Qty: 200 ML | Refills: 0 | Status: SHIPPED | OUTPATIENT
Start: 2025-01-15 | End: 2025-01-25

## 2025-01-15 RX ORDER — AZITHROMYCIN 250 MG/1
TABLET, FILM COATED ORAL
Qty: 6 TABLET | Refills: 0 | Status: SHIPPED | OUTPATIENT
Start: 2025-01-15 | End: 2025-01-20

## 2025-01-15 RX ORDER — PREDNISONE 10 MG/1
TABLET ORAL
Qty: 21 TABLET | Refills: 0 | Status: SHIPPED | OUTPATIENT
Start: 2025-01-15 | End: 2025-01-24

## 2025-01-15 ASSESSMENT — ENCOUNTER SYMPTOMS
RHINORRHEA: 1
COUGH: 1
FATIGUE: 1
GASTROINTESTINAL NEGATIVE: 1
SORE THROAT: 1
SINUS PRESSURE: 1
CARDIOVASCULAR NEGATIVE: 1
FEVER: 1
HEADACHES: 1
CHILLS: 1

## 2025-01-15 NOTE — PROGRESS NOTES
"Subjective   Patient ID: Kindra Fuentes is a 49 y.o. female. They present today with a chief complaint of Nasal Congestion, Headache, Fatigue, Chills, Cough, and Nausea (Yellow/brown sputum ).    History of Present Illness  Subjective  History was provided by the patient.  Kindra Fuentes is a 49 y.o. female who presents for evaluation of symptoms of a URI. Symptoms include chest pain during cough, chills, dry cough, nasal blockage, post nasal drip, sinus and nasal congestion, and sore throat. Onset of symptoms was 2 days ago, gradually worsening since that time. Pt was recently diagnosed with sinusitis, and is on her last day of antibiotics. She is not drinking much. Treatment to date: antibiotics    Objective  BP (!) 148/95   Pulse 83   Temp 37 °C (98.6 °F)   Resp 20   Ht 1.6 m (5' 3\")   Wt 77.1 kg (170 lb)   SpO2 99%   BMI 30.11 kg/m²   [unfilled]     Assessment/Plan  bronchitis and viral upper respiratory illness    Discussed diagnosis and treatment of URI.  Suggested symptomatic OTC remedies.  Nasal saline spray for congestion.  Zithromax per orders.  Follow up as needed.        History provided by:  Patient and medical records  Headache  Associated symptoms: congestion, cough, ear pain, fatigue, fever, sinus pressure and sore throat    Fatigue  Associated symptoms: congestion, cough, ear pain, fatigue, fever, headaches, rhinorrhea and sore throat    Cough  Associated symptoms include chills, ear pain, a fever, headaches, rhinorrhea and a sore throat.       Past Medical History  Allergies as of 01/15/2025    (No Known Allergies)       (Not in a hospital admission)       Past Medical History:   Diagnosis Date    Encounter for other preprocedural examination 07/06/2017    Preoperative clearance    Personal history of other diseases of the circulatory system     History of hypertension    Personal history of other diseases of the digestive system 12/01/2016    History of esophageal reflux       Past " "Surgical History:   Procedure Laterality Date    CT ANGIO NECK  9/28/2019    CT NECK ANGIO W AND WO IV CONTRAST 9/28/2019 [facility]    CT HEAD ANGIO W AND WO IV CONTRAST  9/28/2019    CT HEAD ANGIO W AND WO IV CONTRAST 9/28/2019 [facility]    OTHER SURGICAL HISTORY  12/06/2013    Laparoscopic Colpopexy    OTHER SURGICAL HISTORY  11/15/2017    Gastric Surgery For Morbid Obesity Laparoscopic Longitudinal Gastrectomy    US ASPIRATION INJECTION INTERMEDIATE JOINT  8/24/2020    US ASPIRATION INJECTION INTERMEDIATE JOINT 8/24/2020 [facility]        reports that she has never smoked. She has never used smokeless tobacco. She reports current alcohol use of about 3.0 standard drinks of alcohol per week. She reports that she does not currently use drugs.    Review of Systems  Review of Systems   Constitutional:  Positive for chills, fatigue and fever.   HENT:  Positive for congestion, ear pain, rhinorrhea, sinus pressure and sore throat.    Respiratory:  Positive for cough.    Cardiovascular: Negative.    Gastrointestinal: Negative.    Neurological:  Positive for headaches.   All other systems reviewed and are negative.                                 Objective    Vitals:    01/15/25 1040   BP: (!) 148/95   Pulse: 83   Resp: 20   Temp: 37 °C (98.6 °F)   SpO2: 99%   Weight: 77.1 kg (170 lb)   Height: 1.6 m (5' 3\")     No LMP recorded. Patient has had an implant.    Physical Exam  Vitals and nursing note reviewed.   Constitutional:       General: She is not in acute distress.     Appearance: Normal appearance. She is ill-appearing.   HENT:      Head: Atraumatic.      Right Ear: A middle ear effusion is present. Tympanic membrane is bulging.      Left Ear: A middle ear effusion is present.      Nose: Mucosal edema, congestion and rhinorrhea present. Rhinorrhea is clear and purulent.      Right Turbinates: Swollen.      Left Turbinates: Swollen.      Mouth/Throat:      Lips: Pink.      Mouth: Mucous " membranes are moist.      Pharynx: Uvula midline. Posterior oropharyngeal erythema and postnasal drip present.   Cardiovascular:      Rate and Rhythm: Normal rate and regular rhythm.      Pulses: Normal pulses.      Heart sounds: Normal heart sounds.   Pulmonary:      Effort: Pulmonary effort is normal.      Breath sounds: Normal air entry. Examination of the right-upper field reveals wheezing. Examination of the left-upper field reveals wheezing. Wheezing present. No decreased breath sounds.   Musculoskeletal:      Cervical back: Normal range of motion and neck supple.   Skin:     General: Skin is warm and dry.      Capillary Refill: Capillary refill takes less than 2 seconds.   Neurological:      Mental Status: She is alert and oriented to person, place, and time.   Psychiatric:         Behavior: Behavior normal.         Procedures    Point of Care Test & Imaging Results from this visit  No results found for this visit on 01/15/25.   No results found.    Diagnostic study results (if any) were reviewed by NLEA Sorto.    Assessment/Plan   Allergies, medications, history, and pertinent labs/EKGs/Imaging reviewed by NELA Sorto.     Medical Decision Making  Risks, benefits, and alternatives of the medications and treatment plan prescribed today were discussed, and patient expressed understanding. Plan follow up as discussed or as needed if any worsening symptoms or change in condition. Reinforced red flags including (but not limited to): severe or worsening pain; difficulty swallowing; stiff neck; shortness of breath; coughing or vomiting blood; chest pain; and new or increased fever are indications to go to the Emergency Department.  At time of discharge patient was clinically well-appearing and HDS for outpatient management. The patient and/or family was educated regarding diagnosis, supportive care, OTC and Rx medications. The patient and/or family was given the opportunity to ask  questions prior to discharge.  They verbalized understanding of my discussion of the plans for treatment, expected course, indications to return to  or seek further evaluation in ED, and the need for timely follow up as directed.   They were provided with a work/school excuse if requested. The after-visit summary was given to the patient and care instructions were reviewed with the patient. All questions were answered and the patient verbalized understanding of the plan of care for today.  Plan:  Recent visit notes reviewed  Meds as above  Increase clear fluids  Pcp follow up this week if not improving or worsening  ER visit anytime 24/7 for acute worsening or changing condition      Orders and Diagnoses  Diagnoses and all orders for this visit:  COVID  -     brompheniramine-pseudoeph-DM 2-30-10 mg/5 mL syrup; Take 10 mL by mouth 4 times a day as needed for cough or congestion for up to 10 days.  -     predniSONE (Deltasone) 10 mg tablet; Take 4 tablets (40 mg) by mouth once daily for 3 days, THEN 2 tablets (20 mg) once daily for 3 days, THEN 1 tablet (10 mg) once daily for 3 days.  -     azithromycin (Zithromax) 250 mg tablet; Take 2 tablets (500 mg) by mouth once daily for 1 day, THEN 1 tablet (250 mg) once daily for 4 days.  Cough, unspecified type  -     POCT Covid-19 Rapid Antigen  -     POCT Influenza A/B manually resulted  Chills  -     POCT Covid-19 Rapid Antigen  -     POCT Influenza A/B manually resulted      Medical Admin Record      Patient disposition: Home    Electronically signed by NELA Sorto  11:13 AM

## 2025-01-23 ENCOUNTER — HOSPITAL ENCOUNTER (EMERGENCY)
Age: 50
Discharge: HOME | End: 2025-01-24
Payer: COMMERCIAL

## 2025-01-23 DIAGNOSIS — R19.7 NAUSEA VOMITING AND DIARRHEA: Primary | ICD-10-CM

## 2025-01-23 DIAGNOSIS — R11.2 NAUSEA VOMITING AND DIARRHEA: Primary | ICD-10-CM

## 2025-01-23 LAB
BASOPHILS # BLD: 0 K/UL (ref 0–0.2)
BASOPHILS NFR BLD: 0.1 %
EOSINOPHIL # BLD: 0.2 K/UL (ref 0–0.7)
EOSINOPHIL NFR BLD: 2.2 %
ERYTHROCYTE [DISTWIDTH] IN BLOOD BY AUTOMATED COUNT: 13.3 % (ref 11.5–14.5)
HCT VFR BLD AUTO: 44.4 % (ref 37–47)
HGB BLD-MCNC: 15.4 G/DL (ref 12–16)
LYMPHOCYTES # BLD: 0.3 K/UL (ref 1–4.8)
LYMPHOCYTES NFR BLD: 3.8 %
MCH RBC QN AUTO: 31 PG (ref 27–31.3)
MCHC RBC AUTO-ENTMCNC: 34.7 % (ref 33–37)
MCV RBC AUTO: 89.3 FL (ref 79.4–94.8)
MONOCYTES # BLD: 0.2 K/UL (ref 0.2–0.8)
MONOCYTES NFR BLD: 3.4 %
NEUTROPHILS # BLD: 6.2 K/UL (ref 1.4–6.5)
NEUTS SEG NFR BLD: 90.2 %
PLATELET # BLD AUTO: 279 K/UL (ref 130–400)
RBC # BLD AUTO: 4.97 M/UL (ref 4.2–5.4)
WBC # BLD AUTO: 6.8 K/UL (ref 4.8–10.8)

## 2025-01-23 PROCEDURE — 87502 INFLUENZA DNA AMP PROBE: CPT

## 2025-01-23 PROCEDURE — 83605 ASSAY OF LACTIC ACID: CPT

## 2025-01-23 PROCEDURE — 87635 SARS-COV-2 COVID-19 AMP PRB: CPT

## 2025-01-23 PROCEDURE — 99284 EMERGENCY DEPT VISIT MOD MDM: CPT

## 2025-01-23 PROCEDURE — 85025 COMPLETE CBC W/AUTO DIFF WBC: CPT

## 2025-01-23 PROCEDURE — 80053 COMPREHEN METABOLIC PANEL: CPT

## 2025-01-23 PROCEDURE — 96361 HYDRATE IV INFUSION ADD-ON: CPT

## 2025-01-23 PROCEDURE — 96375 TX/PRO/DX INJ NEW DRUG ADDON: CPT

## 2025-01-23 PROCEDURE — 96374 THER/PROPH/DIAG INJ IV PUSH: CPT

## 2025-01-23 PROCEDURE — 2500000003 HC RX 250 WO HCPCS: Performed by: PHYSICIAN ASSISTANT

## 2025-01-23 PROCEDURE — 6360000002 HC RX W HCPCS: Performed by: PHYSICIAN ASSISTANT

## 2025-01-23 PROCEDURE — 2580000003 HC RX 258: Performed by: PHYSICIAN ASSISTANT

## 2025-01-23 PROCEDURE — 83735 ASSAY OF MAGNESIUM: CPT

## 2025-01-23 RX ORDER — ONDANSETRON 2 MG/ML
4 INJECTION INTRAMUSCULAR; INTRAVENOUS ONCE
Status: COMPLETED | OUTPATIENT
Start: 2025-01-23 | End: 2025-01-23

## 2025-01-23 RX ORDER — SODIUM CHLORIDE, SODIUM LACTATE, POTASSIUM CHLORIDE, AND CALCIUM CHLORIDE .6; .31; .03; .02 G/100ML; G/100ML; G/100ML; G/100ML
500 INJECTION, SOLUTION INTRAVENOUS ONCE
Status: COMPLETED | OUTPATIENT
Start: 2025-01-23 | End: 2025-01-24

## 2025-01-23 RX ADMIN — SODIUM CHLORIDE, POTASSIUM CHLORIDE, SODIUM LACTATE AND CALCIUM CHLORIDE 500 ML: 600; 310; 30; 20 INJECTION, SOLUTION INTRAVENOUS at 23:51

## 2025-01-23 RX ADMIN — FAMOTIDINE 20 MG: 10 INJECTION, SOLUTION INTRAVENOUS at 23:53

## 2025-01-23 RX ADMIN — ONDANSETRON 4 MG: 2 INJECTION, SOLUTION INTRAMUSCULAR; INTRAVENOUS at 23:52

## 2025-01-23 ASSESSMENT — LIFESTYLE VARIABLES
HOW MANY STANDARD DRINKS CONTAINING ALCOHOL DO YOU HAVE ON A TYPICAL DAY: 1 OR 2
HOW OFTEN DO YOU HAVE A DRINK CONTAINING ALCOHOL: 2-4 TIMES A MONTH

## 2025-01-23 ASSESSMENT — PAIN - FUNCTIONAL ASSESSMENT: PAIN_FUNCTIONAL_ASSESSMENT: 0-10

## 2025-01-23 ASSESSMENT — PAIN DESCRIPTION - PAIN TYPE: TYPE: ACUTE PAIN

## 2025-01-24 VITALS
HEART RATE: 103 BPM | BODY MASS INDEX: 37.02 KG/M2 | TEMPERATURE: 98.5 F | SYSTOLIC BLOOD PRESSURE: 133 MMHG | WEIGHT: 209 LBS | RESPIRATION RATE: 27 BRPM | DIASTOLIC BLOOD PRESSURE: 94 MMHG | OXYGEN SATURATION: 93 %

## 2025-01-24 LAB
ALBUMIN SERPL-MCNC: 4.3 G/DL (ref 3.5–4.6)
ALP SERPL-CCNC: 50 U/L (ref 40–130)
ALT SERPL-CCNC: 17 U/L (ref 0–33)
ANION GAP SERPL CALCULATED.3IONS-SCNC: 11 MEQ/L (ref 9–15)
AST SERPL-CCNC: 16 U/L (ref 0–35)
BILIRUB SERPL-MCNC: 1.1 MG/DL (ref 0.2–0.7)
BUN SERPL-MCNC: 29 MG/DL (ref 6–20)
CALCIUM SERPL-MCNC: 8.8 MG/DL (ref 8.5–9.9)
CHLORIDE SERPL-SCNC: 97 MEQ/L (ref 95–107)
CO2 SERPL-SCNC: 27 MEQ/L (ref 20–31)
CREAT SERPL-MCNC: 0.79 MG/DL (ref 0.5–0.9)
GLOBULIN SER CALC-MCNC: 2.4 G/DL (ref 2.3–3.5)
GLUCOSE SERPL-MCNC: 147 MG/DL (ref 70–99)
INFLUENZA A BY PCR: NEGATIVE
INFLUENZA B BY PCR: NEGATIVE
LACTATE BLDV-SCNC: 1.1 MMOL/L (ref 0.5–2.2)
MAGNESIUM SERPL-MCNC: 1.9 MG/DL (ref 1.7–2.4)
POTASSIUM SERPL-SCNC: 4 MEQ/L (ref 3.4–4.9)
PROT SERPL-MCNC: 6.7 G/DL (ref 6.3–8)
SARS-COV-2 RDRP RESP QL NAA+PROBE: NOT DETECTED
SODIUM SERPL-SCNC: 135 MEQ/L (ref 135–144)

## 2025-01-24 PROCEDURE — 96376 TX/PRO/DX INJ SAME DRUG ADON: CPT

## 2025-01-24 PROCEDURE — 2580000003 HC RX 258: Performed by: PHYSICIAN ASSISTANT

## 2025-01-24 PROCEDURE — 6370000000 HC RX 637 (ALT 250 FOR IP): Performed by: PHYSICIAN ASSISTANT

## 2025-01-24 PROCEDURE — 96361 HYDRATE IV INFUSION ADD-ON: CPT

## 2025-01-24 PROCEDURE — 6360000002 HC RX W HCPCS: Performed by: PHYSICIAN ASSISTANT

## 2025-01-24 RX ORDER — ONDANSETRON 2 MG/ML
4 INJECTION INTRAMUSCULAR; INTRAVENOUS ONCE
Status: COMPLETED | OUTPATIENT
Start: 2025-01-24 | End: 2025-01-24

## 2025-01-24 RX ORDER — DICYCLOMINE HYDROCHLORIDE 10 MG/1
10 CAPSULE ORAL ONCE
Status: COMPLETED | OUTPATIENT
Start: 2025-01-24 | End: 2025-01-24

## 2025-01-24 RX ORDER — DICYCLOMINE HCL 20 MG
20 TABLET ORAL 3 TIMES DAILY PRN
Qty: 12 TABLET | Refills: 0 | Status: SHIPPED | OUTPATIENT
Start: 2025-01-24

## 2025-01-24 RX ORDER — ONDANSETRON 4 MG/1
4 TABLET, FILM COATED ORAL 3 TIMES DAILY PRN
Qty: 15 TABLET | Refills: 0 | Status: SHIPPED | OUTPATIENT
Start: 2025-01-24

## 2025-01-24 RX ORDER — SODIUM CHLORIDE, SODIUM LACTATE, POTASSIUM CHLORIDE, AND CALCIUM CHLORIDE .6; .31; .03; .02 G/100ML; G/100ML; G/100ML; G/100ML
500 INJECTION, SOLUTION INTRAVENOUS ONCE
Status: COMPLETED | OUTPATIENT
Start: 2025-01-24 | End: 2025-01-24

## 2025-01-24 RX ADMIN — DICYCLOMINE HYDROCHLORIDE 10 MG: 10 CAPSULE ORAL at 00:47

## 2025-01-24 RX ADMIN — ONDANSETRON 4 MG: 2 INJECTION, SOLUTION INTRAMUSCULAR; INTRAVENOUS at 00:46

## 2025-01-24 RX ADMIN — SODIUM CHLORIDE, POTASSIUM CHLORIDE, SODIUM LACTATE AND CALCIUM CHLORIDE 500 ML: 600; 310; 30; 20 INJECTION, SOLUTION INTRAVENOUS at 00:36

## 2025-01-24 ASSESSMENT — PAIN DESCRIPTION - LOCATION: LOCATION: ABDOMEN

## 2025-01-24 ASSESSMENT — PAIN SCALES - GENERAL: PAINLEVEL_OUTOF10: 4

## 2025-01-24 ASSESSMENT — PAIN - FUNCTIONAL ASSESSMENT: PAIN_FUNCTIONAL_ASSESSMENT: NONE - DENIES PAIN

## 2025-01-24 ASSESSMENT — PAIN DESCRIPTION - DESCRIPTORS: DESCRIPTORS: CRAMPING

## 2025-01-24 NOTE — DISCHARGE INSTRUCTIONS
Please stay well hydrated.  You can take Zofran as needed for nausea.  Bentyl as needed for abdominal pain and cramping but please stop taking that she developed bloody diarrhea or high fever.  You should return for those symptoms as well as other new or worsening symptoms

## 2025-01-24 NOTE — ED PROVIDER NOTES
mass.      Tenderness: There is no abdominal tenderness. There is no right CVA tenderness, left CVA tenderness, guarding or rebound.      Hernia: No hernia is present.   Musculoskeletal:         General: No swelling or deformity. Normal range of motion.      Cervical back: Normal range of motion and neck supple. No rigidity or tenderness.   Lymphadenopathy:      Cervical: No cervical adenopathy.   Skin:     General: Skin is warm.      Capillary Refill: Capillary refill takes less than 2 seconds.      Coloration: Skin is not jaundiced or pale.   Neurological:      General: No focal deficit present.      Mental Status: She is alert and oriented to person, place, and time. Mental status is at baseline.      Cranial Nerves: No cranial nerve deficit.      Gait: Gait normal.   Psychiatric:         Mood and Affect: Mood normal.         Behavior: Behavior normal.         Thought Content: Thought content normal.         Judgment: Judgment normal.           DIAGNOSTIC RESULTS     RADIOLOGY:   Non-plain film images such as CT, Ultrasound and MRI are read by the radiologist. Plain radiographic images are visualized and preliminarilyinterpreted by Mayo Ruby PA-C with the below findings:  Read By Myself:        Interpretation per the Radiologist below, if available at the time of this note:    No orders to display       LABS:  Labs Reviewed   CBC WITH AUTO DIFFERENTIAL - Abnormal; Notable for the following components:       Result Value    Lymphocytes Absolute 0.3 (*)     All other components within normal limits   COMPREHENSIVE METABOLIC PANEL - Abnormal; Notable for the following components:    Glucose 147 (*)     BUN 29 (*)     Total Bilirubin 1.1 (*)     All other components within normal limits   COVID-19, RAPID   RAPID INFLUENZA A/B ANTIGENS   MAGNESIUM   LACTIC ACID       All other labs were within normal range or not returnedas of this dictation.    EMERGENCYDEPARTMENT COURSE and DIFFERENTIAL DIAGNOSIS/MDM:

## 2025-02-03 ENCOUNTER — APPOINTMENT (OUTPATIENT)
Dept: PRIMARY CARE | Facility: CLINIC | Age: 50
End: 2025-02-03
Payer: COMMERCIAL

## 2025-02-25 ENCOUNTER — TELEPHONE (OUTPATIENT)
Dept: PRIMARY CARE | Facility: CLINIC | Age: 50
End: 2025-02-25
Payer: COMMERCIAL

## 2025-02-25 NOTE — TELEPHONE ENCOUNTER
Pt left vm requesting MMR titer, T Spot for school.  Also, Varicella vax.  Had Chicken pox when 10 y/o.  Will accept titers.  Please advise.

## 2025-02-26 DIAGNOSIS — Z71.85 ENCOUNTER FOR COUNSELING REGARDING IMMUNIZATION: Primary | ICD-10-CM

## 2025-02-27 ENCOUNTER — TELEPHONE (OUTPATIENT)
Dept: PRIMARY CARE | Facility: CLINIC | Age: 50
End: 2025-02-27
Payer: COMMERCIAL

## 2025-02-27 NOTE — TELEPHONE ENCOUNTER
----- Message from Tammie Russell sent at 2/27/2025  3:09 PM EST -----  Call patient titers are all good  Still waiting on tb result  Doesn't need any boosters  Can print off [employer]

## 2025-02-27 NOTE — RESULT ENCOUNTER NOTE
Call patient titers are all good  Still waiting on tb result  Doesn't need any boosters  Can print off [employer]

## 2025-02-28 LAB
IGNF NEG CNTRL BLD: NORMAL
M TB IFN-G BLD-IMP: NEGATIVE
MEV IGG SER IA-ACNC: >300 AU/ML
MITOGEN IGNF.SPOT COUNT BLD: NORMAL
MUV IGG SER IA-ACNC: 51.6 AU/ML
QUEST PANEL A SPOT COUNT: 1
QUEST PANEL B SPOT COUNT: 0
RUBV IGG SERPL IA-ACNC: 4.91 INDEX
VZV IGG SER IA-ACNC: 8.33 S/CO

## 2025-03-03 ENCOUNTER — TELEPHONE (OUTPATIENT)
Dept: PRIMARY CARE | Facility: CLINIC | Age: 50
End: 2025-03-03
Payer: COMMERCIAL

## 2025-03-24 DIAGNOSIS — M25.562 LEFT KNEE PAIN, UNSPECIFIED CHRONICITY: Primary | ICD-10-CM

## 2025-03-24 NOTE — PROGRESS NOTES
History of Present Illness  No chief complaint on file.      The patient presents with side: left knee pain for 1 year.  The patient complains of worsening pain over the past 6 months.  Recently there has been concern for falls and instability.  There is increasing difficulty with activities of daily living and significant disability related to the knee pain.  The patient endorses the following non-operative treatments: Rest, ice, elevation and Injections.   There is increasing frustration with persistent pain and swelling and decreasing distance of ambulation.  The patient has difficulty with stairs as well as getting up from the floor.  The patient has difficulty putting on their socks and shoes as well as getting in and out of a car.    She has tried corticosteroid injections and viscosupplementation injections with minimal relief.  She has a history of a pulmonary embolism but is not on any anticoagulant therapy for this as it is years old.  She also has a history of gastric sleeve surgery.  She avoids NSAIDs because of this.    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 Sig Documenting Provider Last Dose Status   albuterol 90 mcg/actuation inhaler [id number]  INHALE 2 PUFFS EVERY 4 HOURS IF NEEDED FOR WHEEZING. Tammie Russell, DO  Active   cetirizine (ZyrTEC) 10 mg tablet [id number] No Take 1 tablet (10 mg) by mouth once daily. Historical Provider, MD Taking Active   Durolane 60 mg/3 mL injection [id number] No  Historical  Provider, MD Taking Active   fluticasone (Flonase) 50 mcg/actuation nasal spray [id number] No Administer 2 sprays into affected nostril(s) once daily. Historical Provider, MD Taking Active   fluticasone propion-salmeteroL (Advair Diskus) 250-50 mcg/dose diskus inhaler [id number]  Inhale 1 puff 2 times a day. Rinse mouth with water after use to reduce aftertaste and incidence of candidiasis. Do not swallow. Tammie Russell, DO  Active   levonorgestrel (Mirena) 21 mcg/24 hours (8 yrs) 52 mg IUD [id number] No Mirena (52 MG) 20 MCG/24HR IUD   Quantity: 1  Refills: 0        Start : 23-Aug-2017   Active Historical Provider, MD Taking Active   losartan (Cozaar) 50 mg tablet [id number] No TAKE 1 TABLET BY MOUTH ONCE DAILY AS DIRECTED Tammie Russell DO Taking Active   multivitamin tablet [id number] No Take 1 tablet by mouth once daily. Historical Provider, MD Taking Active                    No Known Allergies    Social History     Socioeconomic History    Marital status:      Spouse name: Not on file    Number of children: Not on file    Years of education: Not on file    Highest education level: Not on file   Occupational History    Not on file   Tobacco Use    Smoking status: Never    Smokeless tobacco: Never   Vaping Use    Vaping status: Never Used   Substance and Sexual Activity    Alcohol use: Yes     Alcohol/week: 3.0 standard drinks of alcohol     Types: 3 Standard drinks or equivalent per week    Drug use: Not Currently    Sexual activity: Yes     Birth control/protection: I.U.D.   Other Topics Concern    Not on file   Social History Narrative    Not on file     Social Drivers of Health     Financial Resource Strain: Not on file   Food Insecurity: Not on file   Transportation Needs: Not on file   Physical Activity: Not on file   Stress: Not on file   Social Connections: Not on file   Intimate Partner Violence: Not on file   Housing Stability: Not on file       Past Surgical History:   Procedure Laterality Date     CT ANGIO NECK  9/28/2019    CT NECK ANGIO W AND WO IV CONTRAST 9/28/2019 [facility]    CT HEAD ANGIO W AND WO IV CONTRAST  9/28/2019    CT HEAD ANGIO W AND WO IV CONTRAST 9/28/2019 [facility]    OTHER SURGICAL HISTORY  12/06/2013    Laparoscopic Colpopexy    OTHER SURGICAL HISTORY  11/15/2017    Gastric Surgery For Morbid Obesity Laparoscopic Longitudinal Gastrectomy    US ASPIRATION INJECTION INTERMEDIATE JOINT  8/24/2020    US ASPIRATION INJECTION INTERMEDIATE JOINT 8/24/2020 [facility]       BMI  30    Pain is described as aching, sore, stiff, and sharp     Location: medial, lateral, popliteral  Pain level: 8  Assistive device: is not using any ambulatory assistive devices  History of surgery: no       Review of Systems   GENERAL: Negative for malaise, significant weight loss, fever  MUSCULOSKELETAL: see HPI  NEURO:  Negative     Exam  side: left Knee:  Skin healthy and intact  No gross swelling or ecchymosis  Alignment: moderate varus    Correctable: full     Effusion: mild      ROM: 0 and 120  moderate Crepitus with range of motion  Tenderness to palpation over medial and lateral joint line and with patellar compression      No laxity to valgus stress  No laxity to varus stress  Negative Lachman´s test  Negative posterior drawer test  negative Marvel´s test  Logrolling of hip negative  No pain with internal rotation of the hip  Straight leg raise negative  Neurovascular exam normal distally  2+ DP pulse and good cap refill     Radiographs  Electrocardiogram 12 Lead  Normal sinus rhythm  Septal infarct , age undetermined , possible  Inferior infarct , age undetermined , possible Abnormal ECG  Nonspecific T wave abnormality  No previous ECGs available  Correlation advised  Confirmed by HILL NELSON JOHN ([id number]) on 5/17/2016 5:25:42 PM  Electrocardiogram 12 Lead  Normal sinus rhythm  Septal infarct (cited on or before 17-MAY-2016)  Possible Inferior infarct (cited on or before  17-MAY-2016)  Abnormal ECG  When compared with ECG of 17-MAY-2016 16:34,  QT has lengthened  Confirmed by HILL Mohr Kara ([id number]) on 1/6/2017 7:59:57 PM  Electrocardiogram 12 Lead  Sinus tachycardia with premature atrial complexes  poor R progression  Nonspecific ST and T wave abnormality  Abnormal ECG  When compared with ECG of 04-JAN-2017 00:47,  premature atrial complexes are now present  Confirmed by HILL NELSON JOHN ([id number]) on 2/11/2019 12:47:24 PM         Assessment  Osteoarthrosis side: left knee      Plan  We discussed with the patient the diagnosis of degenerative joint disease of the knee.  We reviewed an evidence-based approach to osteoarthritis of the knee.  We strongly encouraged low-impact aerobic activity and non-opioid analgesics.     We discussed temporary pain relief with corticosteroid injections and the associated risks.  We also discussed the conflicting evidence regarding viscosupplementation and potential long-term risks with NSAID´s.  We reviewed the role of bracing for instability and physical therapy for atrophy and gait abnormalities.  We reviewed that the only curative process is for a joint arthroplasty.  The patient elected for the patient has failed to improve with multiple non-operative modalities.  There is increasing difficulty with activities of daily living and concern for falls.  The patient is endorsing severe pain and disability.       We had a lengthy discussion regarding total knee arthroplasty including the orthopaedic risks, including but not limited to, stiffness, infection, hematoma, early aseptic loosening, neurologic or vascular injury, clicking, difficulty kneeling and incomplete relief of pain.  We reviewed the medical risks, including but not limited to, deep venous thrombosis, pulmonary embolism, and cardiovascular/pulmonary issues.     We discussed the anticipated longevity of the implants and potential for revision surgery.  We also discussed anticoagulation,  rehabilitation goals, and the hospital course.  We discussed the likelihood of opioid analgesics and risks associated with them.  The next step is to obtain medical risk stratification and encouraged the pre-operative information seminar at the hospital.  We are happy to provide assistance and counseling if the patient has any additional concerns.        Questions were answered.

## 2025-03-25 ENCOUNTER — HOSPITAL ENCOUNTER (OUTPATIENT)
Dept: RADIOLOGY | Facility: HOSPITAL | Age: 50
Discharge: HOME | End: 2025-03-25
Payer: COMMERCIAL

## 2025-03-25 ENCOUNTER — APPOINTMENT (OUTPATIENT)
Dept: ORTHOPEDIC SURGERY | Facility: CLINIC | Age: 50
End: 2025-03-25
Payer: COMMERCIAL

## 2025-03-25 DIAGNOSIS — M21.162 ACQUIRED VARUS DEFORMITY OF KNEE, LEFT: ICD-10-CM

## 2025-03-25 DIAGNOSIS — M25.562 LEFT KNEE PAIN, UNSPECIFIED CHRONICITY: ICD-10-CM

## 2025-03-25 DIAGNOSIS — M17.12 PRIMARY OSTEOARTHRITIS OF LEFT KNEE: Primary | ICD-10-CM

## 2025-03-25 PROCEDURE — 99215 OFFICE O/P EST HI 40 MIN: CPT | Performed by: ORTHOPAEDIC SURGERY

## 2025-03-25 PROCEDURE — E0143 WALKER FOLDING WHEELED W/O S: HCPCS | Performed by: ORTHOPAEDIC SURGERY

## 2025-03-25 PROCEDURE — 73564 X-RAY EXAM KNEE 4 OR MORE: CPT | Mod: 50

## 2025-03-26 ENCOUNTER — TRANSCRIBE ORDERS (OUTPATIENT)
Dept: PREADMISSION TESTING | Facility: HOSPITAL | Age: 50
End: 2025-03-26
Payer: COMMERCIAL

## 2025-03-26 DIAGNOSIS — M17.9 OSTEOARTHROSIS OF KNEE: Primary | ICD-10-CM

## 2025-03-31 ENCOUNTER — TRANSCRIBE ORDERS (OUTPATIENT)
Dept: ORTHOPEDIC SURGERY | Facility: CLINIC | Age: 50
End: 2025-03-31
Payer: COMMERCIAL

## 2025-03-31 DIAGNOSIS — M17.9 OSTEOARTHROSIS OF KNEE: Primary | ICD-10-CM

## 2025-04-07 ENCOUNTER — TELEPHONE (OUTPATIENT)
Dept: PRIMARY CARE | Facility: CLINIC | Age: 50
End: 2025-04-07
Payer: COMMERCIAL

## 2025-04-07 NOTE — TELEPHONE ENCOUNTER
Patient is scheduled for clearance 4/15/25 and is out of town, she states ORTHO scheduled.  She is scheduled for surgery 4/28 wants to know if she can come in aother day what to you recommend?

## 2025-04-08 ENCOUNTER — OFFICE VISIT (OUTPATIENT)
Dept: PRIMARY CARE | Facility: CLINIC | Age: 50
End: 2025-04-08
Payer: COMMERCIAL

## 2025-04-08 VITALS
RESPIRATION RATE: 16 BRPM | WEIGHT: 210 LBS | HEART RATE: 64 BPM | OXYGEN SATURATION: 100 % | BODY MASS INDEX: 37.21 KG/M2 | SYSTOLIC BLOOD PRESSURE: 134 MMHG | TEMPERATURE: 97.9 F | DIASTOLIC BLOOD PRESSURE: 84 MMHG | HEIGHT: 63 IN

## 2025-04-08 DIAGNOSIS — Z86.718 HISTORY OF BLOOD CLOTS: ICD-10-CM

## 2025-04-08 DIAGNOSIS — G47.33 OBSTRUCTIVE SLEEP APNEA SYNDROME: ICD-10-CM

## 2025-04-08 DIAGNOSIS — I10 ESSENTIAL HYPERTENSION: ICD-10-CM

## 2025-04-08 DIAGNOSIS — Z98.84 S/P LAPAROSCOPIC SLEEVE GASTRECTOMY: ICD-10-CM

## 2025-04-08 DIAGNOSIS — J45.20 MILD INTERMITTENT ASTHMATIC BRONCHITIS WITHOUT COMPLICATION (HHS-HCC): ICD-10-CM

## 2025-04-08 DIAGNOSIS — K21.9 GASTROESOPHAGEAL REFLUX DISEASE WITHOUT ESOPHAGITIS: ICD-10-CM

## 2025-04-08 DIAGNOSIS — Z01.818 PREOP EXAMINATION: Primary | ICD-10-CM

## 2025-04-08 DIAGNOSIS — E66.811 OBESITY (BMI 30.0-34.9): ICD-10-CM

## 2025-04-08 DIAGNOSIS — M17.12 PRIMARY OSTEOARTHRITIS OF LEFT KNEE: ICD-10-CM

## 2025-04-08 DIAGNOSIS — I26.94 MULTIPLE SUBSEGMENTAL PULMONARY EMBOLI WITHOUT ACUTE COR PULMONALE: ICD-10-CM

## 2025-04-08 PROBLEM — U07.1 COVID-19: Status: RESOLVED | Noted: 2023-11-15 | Resolved: 2025-04-08

## 2025-04-08 PROCEDURE — 3075F SYST BP GE 130 - 139MM HG: CPT | Performed by: INTERNAL MEDICINE

## 2025-04-08 PROCEDURE — 99214 OFFICE O/P EST MOD 30 MIN: CPT | Performed by: INTERNAL MEDICINE

## 2025-04-08 PROCEDURE — 3079F DIAST BP 80-89 MM HG: CPT | Performed by: INTERNAL MEDICINE

## 2025-04-08 PROCEDURE — 3008F BODY MASS INDEX DOCD: CPT | Performed by: INTERNAL MEDICINE

## 2025-04-08 PROCEDURE — 93000 ELECTROCARDIOGRAM COMPLETE: CPT | Performed by: INTERNAL MEDICINE

## 2025-04-08 PROCEDURE — 1036F TOBACCO NON-USER: CPT | Performed by: INTERNAL MEDICINE

## 2025-04-08 RX ORDER — OMEPRAZOLE 20 MG/1
20 TABLET, DELAYED RELEASE ORAL DAILY
COMMUNITY

## 2025-04-08 ASSESSMENT — ENCOUNTER SYMPTOMS
ARTHRALGIAS: 0
WHEEZING: 0
BACK PAIN: 0
DIFFICULTY URINATING: 0
EYE PAIN: 0
PALPITATIONS: 0
ABDOMINAL PAIN: 0
UNEXPECTED WEIGHT CHANGE: 0
DIARRHEA: 0
FATIGUE: 0
PSYCHIATRIC NEGATIVE: 1
NAUSEA: 0
SHORTNESS OF BREATH: 0
EYE REDNESS: 0
SORE THROAT: 0
HEADACHES: 0
FEVER: 0
CONSTIPATION: 0
COUGH: 0
EYE ITCHING: 0
FREQUENCY: 0
WEAKNESS: 0
RHINORRHEA: 0
DYSURIA: 0

## 2025-04-08 ASSESSMENT — PATIENT HEALTH QUESTIONNAIRE - PHQ9
SUM OF ALL RESPONSES TO PHQ9 QUESTIONS 1 AND 2: 0
2. FEELING DOWN, DEPRESSED OR HOPELESS: NOT AT ALL
1. LITTLE INTEREST OR PLEASURE IN DOING THINGS: NOT AT ALL
1. LITTLE INTEREST OR PLEASURE IN DOING THINGS: NOT AT ALL
2. FEELING DOWN, DEPRESSED OR HOPELESS: NOT AT ALL
SUM OF ALL RESPONSES TO PHQ9 QUESTIONS 1 AND 2: 0

## 2025-04-08 NOTE — PROGRESS NOTES
"Subjective   Kindra Fuentes is a 50 y.o. female who presents for Pre-op Exam.    HPI   Scheduled for L TKR w/Dr. Hollis [facility] 4/28/25.  Denies adverse reaction to Anesthesia in past.  Denies easy bruising/bleeding/epistaxis, recent URI, chest pain, dyspnea, edema.  Occasional heart palps.  No post op concerns.    Review of Systems   Constitutional:  Negative for fatigue, fever and unexpected weight change.   HENT:  Negative for congestion, ear pain, rhinorrhea and sore throat.    Eyes:  Negative for pain, redness and itching.   Respiratory:  Negative for cough, shortness of breath and wheezing.    Cardiovascular:  Negative for chest pain, palpitations and leg swelling.   Gastrointestinal:  Negative for abdominal pain, constipation, diarrhea and nausea.   Genitourinary:  Negative for difficulty urinating, dysuria and frequency.   Musculoskeletal:  Negative for arthralgias and back pain.   Allergic/Immunologic: Negative for environmental allergies, food allergies and immunocompromised state.   Neurological:  Negative for weakness and headaches.   Psychiatric/Behavioral: Negative.     All other systems reviewed and are negative.      Health Maintenance Due   Topic Date Due    HIV Screening  Never done    MMR Vaccines (1 of 1 - Standard series) Never done    Hepatitis C Screening  Never done    Diabetes Screening  Never done    Pneumococcal Vaccine (1 of 2 - PCV) Never done    Mammogram  Never done    Cervical Cancer Screening  08/18/2020    COVID-19 Vaccine (4 - 2024-25 season) 09/01/2024    Zoster Vaccines (1 of 2) Never done       Objective   /84   Pulse 64   Temp 36.6 °C (97.9 °F)   Resp 16   Ht 1.6 m (5' 3\")   Wt 95.3 kg (210 lb)   SpO2 100%   BMI 37.20 kg/m²     Physical Exam  Vitals and nursing note reviewed.   Constitutional:       Appearance: Normal appearance.   HENT:      Head: Normocephalic.   Eyes:      Conjunctiva/sclera: Conjunctivae normal.      Pupils: Pupils are equal, round, and " reactive to light.   Cardiovascular:      Rate and Rhythm: Normal rate and regular rhythm.      Pulses: Normal pulses.      Heart sounds: Normal heart sounds.   Pulmonary:      Effort: Pulmonary effort is normal.      Breath sounds: Normal breath sounds.   Musculoskeletal:         General: No swelling.      Cervical back: Neck supple.   Skin:     General: Skin is warm and dry.   Neurological:      General: No focal deficit present.      Mental Status: She is oriented to person, place, and time.         Assessment/Plan   Problem List Items Addressed This Visit       Asthmatic bronchitis (HHS-HCC)    Essential hypertension    GERD (gastroesophageal reflux disease)    History of blood clots    Obesity (BMI 30.0-34.9)    Pulmonary embolism    S/P laparoscopic sleeve gastrectomy    Sleep apnea     Other Visit Diagnoses       Preop examination    -  Primary    Relevant Orders    ECG 12 lead (Clinic Performed) (Completed)    Primary osteoarthritis of left knee              Pt is acceptable medical risk for total knee replacement  Recommend aggressive dvt prophylaxis due to hx of PE  Stable based on symptoms and exam.  Continue established treatment plan and follow up at least yearly.

## 2025-04-15 ENCOUNTER — APPOINTMENT (OUTPATIENT)
Dept: PRIMARY CARE | Facility: CLINIC | Age: 50
End: 2025-04-15
Payer: COMMERCIAL

## 2025-04-15 ENCOUNTER — APPOINTMENT (OUTPATIENT)
Dept: RADIOLOGY | Facility: HOSPITAL | Age: 50
End: 2025-04-15
Payer: COMMERCIAL

## 2025-04-18 ENCOUNTER — HOSPITAL ENCOUNTER (OUTPATIENT)
Dept: RADIOLOGY | Facility: HOSPITAL | Age: 50
Discharge: HOME | End: 2025-04-18
Payer: COMMERCIAL

## 2025-04-18 ENCOUNTER — APPOINTMENT (OUTPATIENT)
Dept: LAB | Facility: HOSPITAL | Age: 50
End: 2025-04-18
Payer: COMMERCIAL

## 2025-04-18 DIAGNOSIS — M17.9 OSTEOARTHROSIS OF KNEE: ICD-10-CM

## 2025-04-18 LAB
ALBUMIN SERPL BCP-MCNC: 4.1 G/DL (ref 3.4–5)
ALP SERPL-CCNC: 48 U/L (ref 33–110)
ALT SERPL W P-5'-P-CCNC: 13 U/L (ref 7–45)
ANION GAP SERPL CALC-SCNC: 11 MMOL/L (ref 10–20)
AST SERPL W P-5'-P-CCNC: 16 U/L (ref 9–39)
BASOPHILS # BLD AUTO: 0.04 X10*3/UL (ref 0–0.1)
BASOPHILS NFR BLD AUTO: 0.8 %
BILIRUB SERPL-MCNC: 0.9 MG/DL (ref 0–1.2)
BUN SERPL-MCNC: 23 MG/DL (ref 6–23)
CALCIUM SERPL-MCNC: 9.2 MG/DL (ref 8.6–10.3)
CHLORIDE SERPL-SCNC: 106 MMOL/L (ref 98–107)
CO2 SERPL-SCNC: 28 MMOL/L (ref 21–32)
CREAT SERPL-MCNC: 0.67 MG/DL (ref 0.5–1.05)
EGFRCR SERPLBLD CKD-EPI 2021: >90 ML/MIN/1.73M*2
EOSINOPHIL # BLD AUTO: 0.63 X10*3/UL (ref 0–0.7)
EOSINOPHIL NFR BLD AUTO: 12.5 %
ERYTHROCYTE [DISTWIDTH] IN BLOOD BY AUTOMATED COUNT: 12.4 % (ref 11.5–14.5)
EST. AVERAGE GLUCOSE BLD GHB EST-MCNC: 94 MG/DL
GLUCOSE SERPL-MCNC: 98 MG/DL (ref 74–99)
HBA1C MFR BLD: 4.9 % (ref ?–5.7)
HCT VFR BLD AUTO: 40.7 % (ref 36–46)
HGB BLD-MCNC: 13.5 G/DL (ref 12–16)
IMM GRANULOCYTES # BLD AUTO: 0.02 X10*3/UL (ref 0–0.7)
IMM GRANULOCYTES NFR BLD AUTO: 0.4 % (ref 0–0.9)
INR PPP: 1.1 (ref 0.9–1.1)
LYMPHOCYTES # BLD AUTO: 1.16 X10*3/UL (ref 1.2–4.8)
LYMPHOCYTES NFR BLD AUTO: 22.9 %
MCH RBC QN AUTO: 30.3 PG (ref 26–34)
MCHC RBC AUTO-ENTMCNC: 33.2 G/DL (ref 32–36)
MCV RBC AUTO: 92 FL (ref 80–100)
MONOCYTES # BLD AUTO: 0.27 X10*3/UL (ref 0.1–1)
MONOCYTES NFR BLD AUTO: 5.3 %
NEUTROPHILS # BLD AUTO: 2.94 X10*3/UL (ref 1.2–7.7)
NEUTROPHILS NFR BLD AUTO: 58.1 %
NRBC BLD-RTO: 0 /100 WBCS (ref 0–0)
PLATELET # BLD AUTO: 257 X10*3/UL (ref 150–450)
POTASSIUM SERPL-SCNC: 3.9 MMOL/L (ref 3.5–5.3)
PROT SERPL-MCNC: 6.3 G/DL (ref 6.4–8.2)
PROTHROMBIN TIME: 11.8 SECONDS (ref 9.8–12.4)
RBC # BLD AUTO: 4.45 X10*6/UL (ref 4–5.2)
SODIUM SERPL-SCNC: 141 MMOL/L (ref 136–145)
WBC # BLD AUTO: 5.1 X10*3/UL (ref 4.4–11.3)

## 2025-04-18 PROCEDURE — 83036 HEMOGLOBIN GLYCOSYLATED A1C: CPT

## 2025-04-18 PROCEDURE — RXMED WILLOW AMBULATORY MEDICATION CHARGE

## 2025-04-18 PROCEDURE — 85610 PROTHROMBIN TIME: CPT

## 2025-04-18 PROCEDURE — 73700 CT LOWER EXTREMITY W/O DYE: CPT | Mod: LT

## 2025-04-18 PROCEDURE — 80053 COMPREHEN METABOLIC PANEL: CPT

## 2025-04-18 PROCEDURE — 85025 COMPLETE CBC W/AUTO DIFF WBC: CPT

## 2025-04-21 DIAGNOSIS — Z96.652 S/P TOTAL KNEE ARTHROPLASTY, LEFT: Primary | ICD-10-CM

## 2025-04-21 PROCEDURE — RXMED WILLOW AMBULATORY MEDICATION CHARGE

## 2025-04-21 RX ORDER — ONDANSETRON 4 MG/1
4 TABLET, FILM COATED ORAL EVERY 8 HOURS PRN
Qty: 20 TABLET | Refills: 0 | Status: SHIPPED | OUTPATIENT
Start: 2025-04-28 | End: 2025-05-05

## 2025-04-21 RX ORDER — CYCLOBENZAPRINE HCL 10 MG
10 TABLET ORAL 3 TIMES DAILY PRN
Qty: 30 TABLET | Refills: 0 | Status: SHIPPED | OUTPATIENT
Start: 2025-04-28 | End: 2025-05-08

## 2025-04-21 RX ORDER — ASPIRIN 81 MG/1
81 TABLET ORAL 2 TIMES DAILY
Qty: 60 TABLET | Refills: 0 | Status: SHIPPED | OUTPATIENT
Start: 2025-04-28 | End: 2025-04-25 | Stop reason: ENTERED-IN-ERROR

## 2025-04-21 RX ORDER — OXYCODONE HYDROCHLORIDE 5 MG/1
5 TABLET ORAL EVERY 6 HOURS PRN
Qty: 28 TABLET | Refills: 0 | Status: SHIPPED | OUTPATIENT
Start: 2025-04-28 | End: 2025-05-05

## 2025-04-21 RX ORDER — DOCUSATE SODIUM 100 MG/1
100 CAPSULE, LIQUID FILLED ORAL 2 TIMES DAILY
Qty: 14 CAPSULE | Refills: 0 | Status: SHIPPED | OUTPATIENT
Start: 2025-04-28 | End: 2025-05-05

## 2025-04-22 ENCOUNTER — PHARMACY VISIT (OUTPATIENT)
Dept: PHARMACY | Facility: CLINIC | Age: 50
End: 2025-04-22
Payer: COMMERCIAL

## 2025-04-28 ENCOUNTER — PHARMACY VISIT (OUTPATIENT)
Dept: PHARMACY | Facility: CLINIC | Age: 50
End: 2025-04-28
Payer: COMMERCIAL

## 2025-04-28 PROCEDURE — 20985 CPTR-ASST DIR MS PX: CPT | Performed by: ORTHOPAEDIC SURGERY

## 2025-04-28 PROCEDURE — 27447 TOTAL KNEE ARTHROPLASTY: CPT | Performed by: PHYSICIAN ASSISTANT

## 2025-04-28 PROCEDURE — RXMED WILLOW AMBULATORY MEDICATION CHARGE

## 2025-04-28 PROCEDURE — 27447 TOTAL KNEE ARTHROPLASTY: CPT | Performed by: ORTHOPAEDIC SURGERY

## 2025-05-05 ENCOUNTER — APPOINTMENT (OUTPATIENT)
Dept: PRIMARY CARE | Facility: CLINIC | Age: 50
End: 2025-05-05
Payer: COMMERCIAL

## 2025-05-05 DIAGNOSIS — Z96.659 S/P TOTAL KNEE ARTHROPLASTY, UNSPECIFIED LATERALITY: Primary | ICD-10-CM

## 2025-05-05 PROCEDURE — RXMED WILLOW AMBULATORY MEDICATION CHARGE

## 2025-05-05 RX ORDER — OXYCODONE HYDROCHLORIDE 5 MG/1
5 TABLET ORAL EVERY 6 HOURS PRN
Qty: 28 TABLET | Refills: 0 | Status: SHIPPED | OUTPATIENT
Start: 2025-05-05 | End: 2025-05-13

## 2025-05-06 ENCOUNTER — PHARMACY VISIT (OUTPATIENT)
Dept: PHARMACY | Facility: CLINIC | Age: 50
End: 2025-05-06
Payer: COMMERCIAL

## 2025-05-07 ENCOUNTER — PHARMACY VISIT (OUTPATIENT)
Dept: PHARMACY | Facility: CLINIC | Age: 50
End: 2025-05-07
Payer: COMMERCIAL

## 2025-05-07 DIAGNOSIS — Z96.652 S/P TOTAL KNEE ARTHROPLASTY, LEFT: ICD-10-CM

## 2025-05-07 PROCEDURE — RXMED WILLOW AMBULATORY MEDICATION CHARGE

## 2025-05-07 RX ORDER — CYCLOBENZAPRINE HCL 10 MG
10 TABLET ORAL 3 TIMES DAILY PRN
Qty: 30 TABLET | Refills: 0 | Status: SHIPPED | OUTPATIENT
Start: 2025-05-07 | End: 2025-05-17

## 2025-05-12 DIAGNOSIS — Z96.659 S/P TOTAL KNEE ARTHROPLASTY, UNSPECIFIED LATERALITY: ICD-10-CM

## 2025-05-12 DIAGNOSIS — Z96.652 S/P TOTAL KNEE ARTHROPLASTY, LEFT: Primary | ICD-10-CM

## 2025-05-12 RX ORDER — OXYCODONE HYDROCHLORIDE 5 MG/1
5 TABLET ORAL EVERY 6 HOURS PRN
Qty: 28 TABLET | Refills: 0 | Status: CANCELLED | OUTPATIENT
Start: 2025-05-12 | End: 2025-05-19

## 2025-05-12 RX ORDER — OXYCODONE HYDROCHLORIDE 5 MG/1
5 TABLET ORAL EVERY 6 HOURS PRN
Qty: 28 TABLET | Refills: 0 | Status: SHIPPED | OUTPATIENT
Start: 2025-05-12 | End: 2025-05-19

## 2025-05-13 ENCOUNTER — PHARMACY VISIT (OUTPATIENT)
Dept: PHARMACY | Facility: CLINIC | Age: 50
End: 2025-05-13
Payer: COMMERCIAL

## 2025-05-13 DIAGNOSIS — Z96.659 S/P TOTAL KNEE ARTHROPLASTY, UNSPECIFIED LATERALITY: Primary | ICD-10-CM

## 2025-05-13 PROCEDURE — RXMED WILLOW AMBULATORY MEDICATION CHARGE

## 2025-05-13 RX ORDER — OXYCODONE HYDROCHLORIDE 5 MG/1
5 TABLET ORAL EVERY 6 HOURS PRN
Qty: 28 TABLET | Refills: 0 | Status: SHIPPED | OUTPATIENT
Start: 2025-05-13 | End: 2025-05-20

## 2025-05-16 DIAGNOSIS — Z96.652 S/P TOTAL KNEE ARTHROPLASTY, LEFT: ICD-10-CM

## 2025-05-19 ENCOUNTER — PHARMACY VISIT (OUTPATIENT)
Dept: PHARMACY | Facility: CLINIC | Age: 50
End: 2025-05-19
Payer: COMMERCIAL

## 2025-05-19 DIAGNOSIS — Z96.652 S/P TOTAL KNEE ARTHROPLASTY, LEFT: Primary | ICD-10-CM

## 2025-05-19 PROCEDURE — RXMED WILLOW AMBULATORY MEDICATION CHARGE

## 2025-05-19 RX ORDER — CYCLOBENZAPRINE HCL 10 MG
10 TABLET ORAL 3 TIMES DAILY PRN
Qty: 30 TABLET | Refills: 0 | Status: SHIPPED | OUTPATIENT
Start: 2025-05-19 | End: 2025-05-29

## 2025-05-20 ENCOUNTER — EVALUATION (OUTPATIENT)
Dept: PHYSICAL THERAPY | Facility: HOSPITAL | Age: 50
End: 2025-05-20
Payer: COMMERCIAL

## 2025-05-20 ENCOUNTER — HOSPITAL ENCOUNTER (OUTPATIENT)
Dept: RADIOLOGY | Facility: HOSPITAL | Age: 50
Discharge: HOME | End: 2025-05-20
Payer: COMMERCIAL

## 2025-05-20 ENCOUNTER — PHARMACY VISIT (OUTPATIENT)
Dept: PHARMACY | Facility: CLINIC | Age: 50
End: 2025-05-20
Payer: COMMERCIAL

## 2025-05-20 ENCOUNTER — APPOINTMENT (OUTPATIENT)
Dept: ORTHOPEDIC SURGERY | Facility: CLINIC | Age: 50
End: 2025-05-20
Payer: COMMERCIAL

## 2025-05-20 DIAGNOSIS — M25.562 POSTOPERATIVE PAIN OF LEFT KNEE: Primary | ICD-10-CM

## 2025-05-20 DIAGNOSIS — Z96.652 S/P TOTAL KNEE ARTHROPLASTY, LEFT: ICD-10-CM

## 2025-05-20 DIAGNOSIS — G89.18 POSTOPERATIVE PAIN OF LEFT KNEE: Primary | ICD-10-CM

## 2025-05-20 DIAGNOSIS — Z96.652 STATUS POST TOTAL LEFT KNEE REPLACEMENT: Primary | ICD-10-CM

## 2025-05-20 PROCEDURE — 73562 X-RAY EXAM OF KNEE 3: CPT | Mod: LEFT SIDE | Performed by: RADIOLOGY

## 2025-05-20 PROCEDURE — 73562 X-RAY EXAM OF KNEE 3: CPT | Mod: LT

## 2025-05-20 PROCEDURE — 97110 THERAPEUTIC EXERCISES: CPT | Mod: GP

## 2025-05-20 PROCEDURE — 99024 POSTOP FOLLOW-UP VISIT: CPT | Performed by: ORTHOPAEDIC SURGERY

## 2025-05-20 PROCEDURE — RXMED WILLOW AMBULATORY MEDICATION CHARGE

## 2025-05-20 NOTE — PROGRESS NOTES
[facility] Outpatient Physical Therapy    Physical Therapy Evaluation    Date: 2025  Patient: Kindra Fuentes   MRN#: [id number]  : 1975     Problem List Items Addressed This Visit           ICD-10-CM    Postoperative pain of left knee - Primary G89.18, M25.562    Relevant Orders    Follow Up In Physical Therapy       Primary Language: English  Insurance:  Visit number:   Insurance Type: Payor:  EMPLOYEE MEDICAL PLAN / Plan:  EMPLOYEE MEDICAL PLAN CONSUMER [employer] / Product Type: *No Product type* /   Authorization or Plan of Care date Range:    General:  Reason for visit: Post op left TKA.  Date of surgery 25   Referred by: Dr. Christopher Hollis    Precautions:  Fall Risk: Low    General:  Reason for visit: Post op left TKA.  Date of surgery 25   Referred by: Dr Christopher Hollis   PMH: Medical History[1]   Medical History Form: Reviewed (scanned into chart)    Subjective:  Pain:  4/10  Location: left Knee  Discription: aching and tightness  Alleviating: rest, ice, and Meds- Flexeril and Oxycodone    Aggravating:inactivity and being down in flexed postion    Red Flags: Do you have any of the following? 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    Impairments:   MMT    left Hip:  Flexion 4/5  Extension 4-/5  ABD 3+/5  Knee:   Extension: 3+/5    Non-involved limb WFL    ROM   left  Knee:   Flexion:  85 Degrees heelslide; 91deg  "sitting  Extension: 6 Degrees FN     Non-involved limb WFL    Balance: SLS:  L= 10 seconds    Decreased knowledge of HEP    Functional Deficits:   Walking, Managing stairs, Getting in/out of bed, and Rising from chair    Assessment:   Kindra Fuentes is post op left TKA .  Pt amb with straight cane with mild deviations including decrease left stance time with lateral lean.    Recommended Treatment:    Techniques such as ice, compression and elevation to manage pain and swelling.  Therapeutic exercise to gradually increase flexibility and range of motion.  Therapeutic exercises targeting surrounding musculature to stabilize the joint and improve function.  Training to regain normal walking patterns and reduce the risk of falling.  Manual therapy for scar and soft tissue mobilization.  Treatment modalities may include: Gait training, Home program instruction and progression, and Neuromuscular re-education    Nustep: (seat #7 / Ues #9): Level 1  x5min  Alt Step taps x10  Step ups 6\" x   Lateral Lowers 4\" x     Tband:  Red Loop: Hip Extension x   Red Loop: Hip ABD x   Sidestepping    Neuro Re-ed:  Amputee ball Rolls Stance on  : Fwd/retro x , Side/Side      Education:  Provided manual cues for correct performance of exercises.    Outcome Measures:  LEFS: 39/80     Plan:  Plan of care was developed with input and agreement by the patient.  2 x week x 6 weeks    Rehab Potential: Good to achieve goals.    Goals:  Short Term Goals:  3 Visits   Pt to be independent and compliant with home exercise program to promote strength, range of motion, balance/prioprioception and improved posture.  Pt to report worst pain 2/10 for 24 hours to improve activities of daily living tolerance.  Pt to improve ROM of left Knee to 100degrees to improve car transfers.    Long Term Goals:    12 Visits  Pt to improve LEFS from 39/80 to 59/80 to improve ADL prem  Pt to have 4/5 strength of Bilateral Hip ABD to improve balance and stair " "management.  Pt to maintain SLS 10sec 2:3 trials to improve amb and stair management  Pt to report worst pain 1/10 x3 for 3 consecutive days to improve ADL prem  Pt to have 120deg of left Knee flexion to improve amb prem.      Treatment Performed: (\"NP\" = Not Performed)     Therapeutic Exercise:     10 minutes    Manual Therapy:       minutes    Neuromuscular Re-education:      minutes    Gait Training:            minutes        Evaluation Complexity: Low26 minutes;   Re-Evaluation:   minutes             [1]   Past Medical History:  Diagnosis Date    Encounter for other preprocedural examination 07/06/2017    Preoperative clearance    Personal history of other diseases of the circulatory system     History of hypertension    Personal history of other diseases of the digestive system 12/01/2016    History of esophageal reflux     "

## 2025-05-20 NOTE — PROGRESS NOTES
No chief complaint on file.      The patient is here for follow-up of their side: left knee arthroplasty.  The patient has moderate knee pain.  The patient has no mechanical symptoms.  The patient has mild swelling.  The patient is approximately 3 week(s) postop    Physical examination:    Examination of the side: left knee  The incision is healing well  No erythema or warmth.  No instability varus or valgus stressing the knee at 0, 30 or 60 degrees.  No instability in the AP plane at 90 degrees.  Range of motion: 0 degrees extension, 90 degrees flexion  There is mild tenderness  Calf is soft, Homans negative  The patient has intact ankle dorsiflexion and plantarflexion.    Radiographs:   See dictated report        Impression:  Status post side: left total knee arthroplasty    Plan:  Finish Eliquis for DVT prophylaxis  Use support stocking for 1 more week  Refilled pain medications  Outpatient physical therapy  Follow up in 9 weeks for recheck and x-rays  All questions answered

## 2025-05-28 DIAGNOSIS — Z96.652 S/P TOTAL KNEE ARTHROPLASTY, LEFT: ICD-10-CM

## 2025-06-02 ENCOUNTER — PHARMACY VISIT (OUTPATIENT)
Dept: PHARMACY | Facility: CLINIC | Age: 50
End: 2025-06-02
Payer: COMMERCIAL

## 2025-06-02 PROCEDURE — RXMED WILLOW AMBULATORY MEDICATION CHARGE

## 2025-06-02 RX ORDER — CYCLOBENZAPRINE HCL 10 MG
10 TABLET ORAL 3 TIMES DAILY PRN
Qty: 30 TABLET | Refills: 0 | Status: SHIPPED | OUTPATIENT
Start: 2025-06-02 | End: 2025-06-12

## 2025-06-10 ENCOUNTER — TREATMENT (OUTPATIENT)
Dept: PHYSICAL THERAPY | Facility: HOSPITAL | Age: 50
End: 2025-06-10
Payer: COMMERCIAL

## 2025-06-10 DIAGNOSIS — G89.18 POSTOPERATIVE PAIN OF LEFT KNEE: Primary | ICD-10-CM

## 2025-06-10 DIAGNOSIS — M25.562 POSTOPERATIVE PAIN OF LEFT KNEE: Primary | ICD-10-CM

## 2025-06-10 PROCEDURE — 97110 THERAPEUTIC EXERCISES: CPT | Mod: GP,CQ

## 2025-06-10 ASSESSMENT — PAIN - FUNCTIONAL ASSESSMENT: PAIN_FUNCTIONAL_ASSESSMENT: 0-10

## 2025-06-10 ASSESSMENT — PAIN SCALES - GENERAL: PAINLEVEL_OUTOF10: 0 - NO PAIN

## 2025-06-10 NOTE — PROGRESS NOTES
Physical Therapy Treatment    Patient Name: Kindra Fuentes  MRN: [id number]  Today's Date: 6/10/2025     PT Therapeutic Procedures Time Entry  Therapeutic Exercise Time Entry: 41                   Current Problem  1. Postoperative pain of left knee  Follow Up In Physical Therapy          Insurance    ANTHEM                                 30V COMBO PT OT ST TEQUILA YR   APPROVED PT 12V 05/20/25 - 07/20/25     Visit 2/12      Subjective   Pt stating she just feels stiff especially toward the top of her incision.     Pain  Pain Assessment: 0-10  0-10 (Numeric) Pain Score: 0 - No pain      Objective   L knee flexion 101  L knee ext 0  Treatments:  Nu -step seat 8, 8 min   Heel slides 2x10  SLR 2x10  LAQ 2x10  HR/TR on 1/2 roll 2x10  Mini squats 2x10  Step ups F/L 4in x15  3 way hip RTB x15  Standing HSC 2x10    Assessment:  Improved ROM noted with flexion and extension. Added multiple new exercises to increase strength with good tolerance. Observed some difficulty with step ups. Educated pt on normal muscle soreness, updated HEP and reviewed.    Plan:  Cont to progress ROM     HEP:  Access Code: [email or web address]  URL: [email or web address]  Date: 06/10/2025  Prepared by: Rosemary Ibarra    Exercises  - Standing Hip Flexion with Resistance Loop  - 1 x daily - 7 x weekly - 15 reps  - Hip Abduction with Resistance Loop  - 1 x daily - 7 x weekly - 15 reps  - Hip Extension with Resistance Loop  - 1 x daily - 7 x weekly - 15 reps  - Seated Long Arc Quad  - 1 x daily - 7 x weekly - 2 sets - 10 reps  - Squat with Counter Support  - 1 x daily - 7 x weekly - 2 sets - 10 reps  - Standing Knee Flexion AROM with Chair Support  - 1 x daily - 7 x weekly - 2 sets - 10 reps

## 2025-06-12 ENCOUNTER — TREATMENT (OUTPATIENT)
Dept: PHYSICAL THERAPY | Facility: HOSPITAL | Age: 50
End: 2025-06-12
Payer: COMMERCIAL

## 2025-06-12 DIAGNOSIS — M25.562 POSTOPERATIVE PAIN OF LEFT KNEE: Primary | ICD-10-CM

## 2025-06-12 DIAGNOSIS — G89.18 POSTOPERATIVE PAIN OF LEFT KNEE: Primary | ICD-10-CM

## 2025-06-12 PROCEDURE — 97110 THERAPEUTIC EXERCISES: CPT | Mod: GP,CQ

## 2025-06-12 ASSESSMENT — PAIN SCALES - GENERAL: PAINLEVEL_OUTOF10: 0 - NO PAIN

## 2025-06-12 ASSESSMENT — PAIN - FUNCTIONAL ASSESSMENT: PAIN_FUNCTIONAL_ASSESSMENT: 0-10

## 2025-06-12 NOTE — PROGRESS NOTES
"Physical Therapy Treatment    Patient Name: Kindra Fuentes  MRN: [id number]  Today's Date: 6/12/2025     PT Therapeutic Procedures Time Entry  Therapeutic Exercise Time Entry: 43                   Current Problem  1. Postoperative pain of left knee  Follow Up In Physical Therapy          Insurance    ANTHEM                                 30V COMBO PT OT ST TEQUILA YR   APPROVED PT 12V 05/20/25 - 07/20/25      Visit 3/12      Subjective   Pt stating she is having some stiffness this morning but no pain.    Pain  Pain Assessment: 0-10  0-10 (Numeric) Pain Score: 0 - No pain      Objective   L knee flexion 103    Treatments:  Nu-step seat 8, 8 min   Heel slides 2x10  SLR 2x15  LAQ 2x10 1#  HR/TR on 1/2 roll 2x10  Mini squats 2x15  Step ups F/L 4in 2x10  3 way hip RTB 2x10  Step stretch 20\"x3    Assessment:  Pt continues to make small gains in her ROM. Increased reps with most exercises with muscle fatigue observed. Added step stretch to improve L knee flexion. Educated pt to continue to ice as needed.     Plan:  Cont to progress ROM and strength      "

## 2025-06-16 ENCOUNTER — PHARMACY VISIT (OUTPATIENT)
Dept: PHARMACY | Facility: CLINIC | Age: 50
End: 2025-06-16
Payer: COMMERCIAL

## 2025-06-16 ENCOUNTER — TREATMENT (OUTPATIENT)
Dept: PHYSICAL THERAPY | Facility: HOSPITAL | Age: 50
End: 2025-06-16
Payer: COMMERCIAL

## 2025-06-16 ENCOUNTER — OFFICE VISIT (OUTPATIENT)
Dept: ORTHOPEDIC SURGERY | Facility: CLINIC | Age: 50
End: 2025-06-16
Payer: COMMERCIAL

## 2025-06-16 DIAGNOSIS — G89.18 POSTOPERATIVE PAIN OF LEFT KNEE: Primary | ICD-10-CM

## 2025-06-16 DIAGNOSIS — M25.562 POSTOPERATIVE PAIN OF LEFT KNEE: Primary | ICD-10-CM

## 2025-06-16 DIAGNOSIS — Z96.652 S/P TOTAL KNEE ARTHROPLASTY, LEFT: ICD-10-CM

## 2025-06-16 DIAGNOSIS — T81.49XA POST-OPERATIVE WOUND ABSCESS: ICD-10-CM

## 2025-06-16 PROCEDURE — RXMED WILLOW AMBULATORY MEDICATION CHARGE

## 2025-06-16 PROCEDURE — 1036F TOBACCO NON-USER: CPT | Performed by: FAMILY MEDICINE

## 2025-06-16 PROCEDURE — 97110 THERAPEUTIC EXERCISES: CPT | Mod: GP,CQ

## 2025-06-16 PROCEDURE — 99024 POSTOP FOLLOW-UP VISIT: CPT | Performed by: FAMILY MEDICINE

## 2025-06-16 RX ORDER — CEPHALEXIN 500 MG/1
500 CAPSULE ORAL 3 TIMES DAILY
Qty: 21 CAPSULE | Refills: 0 | Status: SHIPPED | OUTPATIENT
Start: 2025-06-16 | End: 2025-06-23

## 2025-06-16 RX ORDER — CYCLOBENZAPRINE HCL 10 MG
10 TABLET ORAL 3 TIMES DAILY PRN
Qty: 30 TABLET | Refills: 0 | Status: SHIPPED | OUTPATIENT
Start: 2025-06-16 | End: 2025-06-26

## 2025-06-16 ASSESSMENT — PAIN SCALES - GENERAL: PAINLEVEL_OUTOF10: 0 - NO PAIN

## 2025-06-16 ASSESSMENT — PAIN - FUNCTIONAL ASSESSMENT: PAIN_FUNCTIONAL_ASSESSMENT: 0-10

## 2025-06-16 NOTE — PROGRESS NOTES
Follow-Up Patient Visit: Lower Extremity Injury  Assessment & Plan  Suture abscess post total left knee arthroplasty  Superficial suture abscesses due to irritation from deep sutures, no cellulitis or erythema.  - Prescribed Keflex 500 mg orally TID for 7 days.  - Scheduled follow-up with Dr. Hollis on Thursday or Friday.  - Apply two small bandages to the affected area.  - Photograph incision for medical chart.    Orders Placed This Encounter    cephalexin (Keflex) 500 mg capsule     1. Post-operative wound abscess      Procedures  At the conclusion of the visit there were no further questions by the patient/family regarding their plan of care.  Patient was instructed to call or return with any issues, questions, or concerns regarding their injury and/or treatment plan described above.    PHYSICAL EXAM:  Neuro: Gross sensation intact to the lower extremities bilaterally.  Lower extremity: Left knee exam as below  Physical Exam  MUSCULOSKELETAL: Two small punctate superficial suture abscesses along the midline incision of the knee. Full intact extensor mechanism of the knee. No obvious effusion in the knee. No spreading cellulitis or erythema in the knee. No bruising in the knee. Calf soft and non-tender.    IMAGING:   Results  Procedure: Wound care  Description: Unroofed scab over incision site. Noted small punctate, superficial suture abscesses along the midline incision of the knee. No obvious effusion, spreading cellulitis, erythema, or ecchymosis. Calf soft and non-tender.  XR knee left 3 views  Narrative: Interpreted By:  John Garnett,   STUDY:  XR KNEE LEFT 3 VIEWS;  5/20/2025 1:17 pm      INDICATION:  Signs/Symptoms:pain.      ,Z96.652 Presence of left artificial knee joint      COMPARISON:  None.      ACCESSION NUMBER(S):  [id number]      ORDERING CLINICIAN:  RON MANNING      FINDINGS:  No acute fracture is identified. No dislocation is seen. There are no  lytic or blastic lesions. Left knee  arthroplasty is intact. No  surrounding lucencies. Small suprapatellar joint effusion.      Impression: Intact left knee arthroplasty.      MACRO:  None.      Signed by: John Garnett 5/21/2025 6:32 PM  Dictation workstation:   [id number]       [facility]  Patient ID: Kindra Fuentes is a 50 y.o. female who presents for No chief complaint on file..  History of Present Illness  Kindra Fuentes is a 50 year old female who presents for a wound check following total left knee arthroplasty.    She noticed a small blister at the incision site over the weekend, which oozed serous fluid, likely due to flexion. Her therapist recommended having it checked.    She describes the sensation at the incision site as feeling like a 'sore pimple' and notes discomfort, although it is tolerable. She recalls a previous experience where a long suture fell out, leaving a 'little nub' that eventually disappeared.    No allergies to antibiotics. She is currently not on any antibiotics but is open to starting a course if necessary.    No obvious effusion, spreading cellulitis, erythema, or bruising. Her calf is soft and non-tender.            This medical note was created with the assistance of artificial intelligence (AI) for documentation purposes. The content has been reviewed and confirmed by the healthcare provider for accuracy and completeness. Patient consented to the use of audio recording and use of AI during their visit.     06/16/25 at 8:54 AM - Cole C Budinsky, MD  Office:  [phone]

## 2025-06-16 NOTE — PROGRESS NOTES
Physical Therapy Treatment  6/16/2025  Patient Name: Kindra Fuentes  MRN: [id number]  Current Problem  1. Postoperative pain of left knee  Follow Up In Physical Therapy        Insurance    [place]  12v 5/20/25 - 7/20/25  Visit 4  Subjective   Pt without much issue. Some discomfort. Still some lateral joint numbness. Still having some slight issues sleeping.  Precautions  Precautions Comment: none  Pain Assessment: 0-10  0-10 (Numeric) Pain Score: 0 - No pain  Objective  Incision with two spots slight discoloration, at end of session escorted down to walk in clinic for evaluation.    Pt walking with st cane and minimal antalgia    L knee ROM 0 to 105-106 degrees flexion    Treatments  NuStep, Seat 8, 8 minutes  Heel slides with strap, 5 sec holds, 20 reps  SLR's, 20 reps  SAQ's, 3 lbs, 20 reps  LAQ's, 3 lbs, 20 reps  TKE's, ball on wall, 5 sec holds, 20 reps  Squatting, 20 reps  Standing hip 3-way, RTB,   HR/TR, 20 reps    Assessment  Pt continues to tolerate treatment well. Motion good, slightly improved over previous session. Still with some pain limiting available flexion. Quad lag noted SLR and LAQ's. Updated HEP, focus on address knee ROM and end range quad strength and control.    Plan:  [1] Continue progressing functional strength and ROM  [2]  [3]  [4]    OP EDUCATION:    Goals:       Time Calculation  Start Time: 0745  Stop Time: 0827  Time Calculation (min): 42 min  PT Therapeutic Procedures Time Entry  Therapeutic Exercise Time Entry: 40

## 2025-06-16 NOTE — PATIENT INSTRUCTIONS
VISIT SUMMARY:  You came in today for a wound check following your total left hip replacement surgery. You noticed a small blister at the incision site that oozed fluid, and your therapist recommended having it checked. You described the sensation as feeling like a 'sore pimple' but tolerable. There were no signs of serious infection.    YOUR PLAN:  -SUTURE ABSCESS POST HIP ARTHROPLASTY: A suture abscess is a small infection that can occur around the stitches used in surgery. It is usually caused by irritation from the sutures. You do not have any signs of a more serious infection. You have been prescribed Keflex 500 mg to take by mouth three times a day for 7 days. Please apply two small bandages to the affected area and take a photograph of the incision for your medical chart.    INSTRUCTIONS:  Please follow up with Dr. Hollis on Thursday or Friday for a re-evaluation of your incision site.

## 2025-06-19 ENCOUNTER — OFFICE VISIT (OUTPATIENT)
Dept: ORTHOPEDIC SURGERY | Facility: CLINIC | Age: 50
End: 2025-06-19
Payer: COMMERCIAL

## 2025-06-19 ENCOUNTER — TREATMENT (OUTPATIENT)
Dept: PHYSICAL THERAPY | Facility: HOSPITAL | Age: 50
End: 2025-06-19
Payer: COMMERCIAL

## 2025-06-19 DIAGNOSIS — Z51.89 VISIT FOR WOUND CHECK: ICD-10-CM

## 2025-06-19 DIAGNOSIS — G89.18 POSTOPERATIVE PAIN OF LEFT KNEE: Primary | ICD-10-CM

## 2025-06-19 DIAGNOSIS — M25.562 POSTOPERATIVE PAIN OF LEFT KNEE: Primary | ICD-10-CM

## 2025-06-19 DIAGNOSIS — Z96.652 STATUS POST TOTAL LEFT KNEE REPLACEMENT: Primary | ICD-10-CM

## 2025-06-19 PROCEDURE — 99024 POSTOP FOLLOW-UP VISIT: CPT | Performed by: ORTHOPAEDIC SURGERY

## 2025-06-19 PROCEDURE — 1036F TOBACCO NON-USER: CPT | Performed by: ORTHOPAEDIC SURGERY

## 2025-06-19 PROCEDURE — 97110 THERAPEUTIC EXERCISES: CPT | Mod: GP,CQ

## 2025-06-19 PROCEDURE — 99212 OFFICE O/P EST SF 10 MIN: CPT | Performed by: ORTHOPAEDIC SURGERY

## 2025-06-19 ASSESSMENT — PAIN SCALES - GENERAL: PAINLEVEL_OUTOF10: 0 - NO PAIN

## 2025-06-19 ASSESSMENT — PAIN - FUNCTIONAL ASSESSMENT: PAIN_FUNCTIONAL_ASSESSMENT: 0-10

## 2025-06-19 NOTE — PROGRESS NOTES
Chief Complaint   Patient presents with    Left Knee - Post-op     TKA 4-28-25  Xrays today       The patient is here for follow-up of their side: left knee arthroplasty.  The patient has no knee pain.  The patient has mild mechanical symptoms.  The patient has mild swelling.  The patient is approximately 7 week(s) postop.  She was seen in our local walk-in clinic for wound drainage.  There were 2 areas over the proximal and mid incision that were draining she was placed on Keflex and told to follow-up with our office.  She has been on Keflex for 2 days now and the incision looks remarkably improved.    Physical examination:    Examination of the side: left knee  The incision is healing well, 2 small areas in question are now healed and closed over without any drainage  I am unable to express anything on palpation  No erythema or warmth.  No instability varus or valgus stressing the knee at 0, 30 or 60 degrees.  No instability in the AP plane at 90 degrees.  Range of motion: 5 degrees extension, 110 degrees flexion  There is no tenderness  Calf is soft, Homans negative  The patient has intact ankle dorsiflexion and plantarflexion.    Radiographs:   XR knee left 3 views  Narrative: Interpreted By:  John Garnett,   STUDY:  XR KNEE LEFT 3 VIEWS;  5/20/2025 1:17 pm      INDICATION:  Signs/Symptoms:pain.      ,Z96.652 Presence of left artificial knee joint      COMPARISON:  None.      ACCESSION NUMBER(S):  [id number]      ORDERING CLINICIAN:  RON MANNING      FINDINGS:  No acute fracture is identified. No dislocation is seen. There are no  lytic or blastic lesions. Left knee arthroplasty is intact. No  surrounding lucencies. Small suprapatellar joint effusion.      Impression: Intact left knee arthroplasty.      MACRO:  None.      Signed by: John Garnett 5/21/2025 6:32 PM  Dictation workstation:   [id number]        Impression:  Status post side: left total knee arthroplasty  Incision check    Plan:  Continue Keflex  until completed  Follow up in 2 weeks for clinical check  All questions answered

## 2025-06-19 NOTE — PROGRESS NOTES
Physical Therapy Treatment  6/19/2025  Patient Name: Kindra Fuentes  MRN: [id number]  Current Problem  1. Postoperative pain of left knee  Follow Up In Physical Therapy        Insurance    [place]  12v 5/20/25 - 7/20/25  Visit 5  Subjective   Pt doing well today. Some soreness, but overall doing fairly well.   Precautions  Precautions Comment: none  Pain Assessment: 0-10  0-10 (Numeric) Pain Score: 0 - No pain  Objective     Pt walking with st cane and minimal antalgia     L knee ROM 0 to 110 degrees flexion     Treatments  NuStep, Seat 8, 10 minutes  Heel slides with strap, 5 sec holds, 30 reps  SLR's, flexion and abduction, 20 reps  SAQ's, 3 lbs, 20 reps  LAQ's, 3 lbs, 20 reps  TKE's, ball on wall, 5 sec holds, 20 reps  Squatting, 20 reps  Standing hip 3-way, RTB, 20 reps  HR/TR, 20 reps  Standing slow marching, 20 reps  Standing HSC, 20 reps  SLS's, 10 sec holds, 10 reps    Assessment  Pt continues to do quite well. Denies pain throughout treatment. Motion is slightly improved to this point. Showing good strength and control, but still with some quad lag during SLR/LAQs. Reviewed HEP. She is set to see MD regarding incision this morning.     Plan:  [1] Continue progression LE strength and functional mobility  [2]  [3]  [4]    OP EDUCATION:    Goals:       Time Calculation  Start Time: 0747  Stop Time: 0827  Time Calculation (min): 40 min  PT Therapeutic Procedures Time Entry  Therapeutic Exercise Time Entry: 40

## 2025-06-23 ENCOUNTER — TREATMENT (OUTPATIENT)
Dept: PHYSICAL THERAPY | Facility: HOSPITAL | Age: 50
End: 2025-06-23
Payer: COMMERCIAL

## 2025-06-23 DIAGNOSIS — G89.18 POSTOPERATIVE PAIN OF LEFT KNEE: Primary | ICD-10-CM

## 2025-06-23 DIAGNOSIS — M25.562 POSTOPERATIVE PAIN OF LEFT KNEE: Primary | ICD-10-CM

## 2025-06-23 PROCEDURE — 97110 THERAPEUTIC EXERCISES: CPT | Mod: GP

## 2025-06-23 PROCEDURE — 97112 NEUROMUSCULAR REEDUCATION: CPT | Mod: GP

## 2025-06-23 NOTE — PROGRESS NOTES
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[employer] / Product Type: *No Product type* /     Pain:  0/10  Location: left Knee    Treatment:  NuStep, Seat 8, L4 [id number]  Alt taps 6\" step x10 B/L  Step ups 4\" x10  Red tball Ball Squat x10  Sitting Heel slides with strap, 5 sec holds, 10 reps    TKE's, ball on wall, 5 sec holds, 20 reps  Sidestepping red tband x 10reps  Monster walk red tband [id number]  Red tband TKE x20    Marches on aeromat x20  Amp Ball roll 11# x20 each    HR/TR, 20 reps  Standing HSC, 1# x20 reps  SLS's, 10 sec holds, 10 reps    Supine SLR x20  Bridge with Red Tband x20    Assessment: AAROM in sitting 108deg    Plan:   Continue with current treatment methods to improve functional mobility and improve ADLs.     Education:  Patient required verbal cues for proper form/technique  Patient required tactile cues for proper form/technique    Goals:  Short Term Goals:  3 Visits   Pt to be independent and compliant with home exercise program to promote strength, range of motion, balance/prioprioception and improved posture.  Pt to report worst pain 2/10 for 24 hours to improve activities of daily living tolerance.  Pt to improve ROM of left Knee to 100degrees to improve car transfers.     Long Term Goals:    12 Visits  Pt to improve LEFS from 39/80 to 59/80 to improve ADL prem  Pt to have 4/5 strength of Bilateral Hip ABD to improve balance and stair management.  Pt to maintain SLS 10sec 2:3 trials to improve amb and stair management  Pt to report worst pain 1/10 x3 for 3 consecutive days to " improve ADL prem  Pt to have 120deg of left Knee flexion to improve amb prem.       Billing:  Time Calculation  Start Time: 0817  Stop Time: 0859  Time Calculation (min): 42 min     PT Therapeutic Procedures Time Entry  Therapeutic Exercise Time Entry: 32  Neuromuscular Re-Education Time Entry: 10

## 2025-06-25 ENCOUNTER — TREATMENT (OUTPATIENT)
Dept: PHYSICAL THERAPY | Facility: HOSPITAL | Age: 50
End: 2025-06-25
Payer: COMMERCIAL

## 2025-06-25 DIAGNOSIS — G89.18 POSTOPERATIVE PAIN OF LEFT KNEE: Primary | ICD-10-CM

## 2025-06-25 DIAGNOSIS — M25.562 POSTOPERATIVE PAIN OF LEFT KNEE: Primary | ICD-10-CM

## 2025-06-25 PROCEDURE — 97110 THERAPEUTIC EXERCISES: CPT | Mod: GP

## 2025-06-25 PROCEDURE — 97112 NEUROMUSCULAR REEDUCATION: CPT | Mod: GP

## 2025-06-25 NOTE — PROGRESS NOTES
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[employer] / Product Type: *No Product type* /     Pain:  2/10  Location: left Knee  Discription: aching    Treatment:  NuStep, Seat 8, L4 [id number]  Alt taps 8\" step x15 B/L  Step ups 4\" x10  Lateral lowers 4\" x10  Red tball Ball Squat x10  Sitting Heel slides with strap, 5 sec holds, 10 reps     TKE's, ball on wall, 5 sec holds, 20 reps  Sidestepping red tband x 10reps  Monster walk red tband [id number]  Red tband TKE x20     Marches on aeromat x20  Amp Ball roll 11# x20 each  Aeromat sidesteping onto mat x20     HR/TR, 20 reps  Standing HSC, 1# x20 reps  >SLS's, 10 sec holds, 10 reps     Supine SLR 1# x20  R Sidelying Hip circles 1# x10 Ccw/cw  Seated 1# LAQ 5sec x10  Bridge with L LE march x10    Assessment: Pt still with noted difficulty with quad control added lateral lowers and Bridge single leg lowers with good prem.  Pt with noted fatigue with LAQ with hold.  AAROM 2-112deg  when amb without cane pt demonstrates L lateral wt shift.    Plan:   Continue with current treatment methods to improve functional mobility and improve ADLs.     Education:  Patient required verbal cues for proper form/technique  Patient required tactile cues for proper form/technique    Goals:  Short Term Goals:  3 Visits   Pt to be independent and compliant with home exercise program to promote strength, range of motion, balance/prioprioception and improved posture.-MET  Pt to report worst pain 2/10 for 24 hours to improve activities of daily living tolerance.  Pt to improve ROM of " left Knee to 100degrees to improve car transfers.-MET     Long Term Goals:    12 Visits  Pt to improve LEFS from 39/80 to 59/80 to improve ADL prem  Pt to have 4/5 strength of Bilateral Hip ABD to improve balance and stair management.  Pt to maintain SLS 10sec 2:3 trials to improve amb and stair management  Pt to report worst pain 1/10 x3 for 3 consecutive days to improve ADL prem  Pt to have 120deg of left Knee flexion to improve amb prem.    Billing:  Time Calculation  Start Time: 0929  Stop Time: 1016  Time Calculation (min): 47 min     PT Therapeutic Procedures Time Entry  Therapeutic Exercise Time Entry: 37  Neuromuscular Re-Education Time Entry: 10

## 2025-06-30 ENCOUNTER — APPOINTMENT (OUTPATIENT)
Dept: PHYSICAL THERAPY | Facility: HOSPITAL | Age: 50
End: 2025-06-30
Payer: COMMERCIAL

## 2025-06-30 ENCOUNTER — HOSPITAL ENCOUNTER (OUTPATIENT)
Dept: RADIOLOGY | Facility: CLINIC | Age: 50
Discharge: HOME | End: 2025-06-30
Payer: COMMERCIAL

## 2025-06-30 ENCOUNTER — OFFICE VISIT (OUTPATIENT)
Dept: ORTHOPEDIC SURGERY | Facility: CLINIC | Age: 50
End: 2025-06-30
Payer: COMMERCIAL

## 2025-06-30 DIAGNOSIS — G89.18 POSTOPERATIVE PAIN OF LEFT KNEE: Primary | ICD-10-CM

## 2025-06-30 DIAGNOSIS — Z96.652 STATUS POST TOTAL LEFT KNEE REPLACEMENT: ICD-10-CM

## 2025-06-30 DIAGNOSIS — M25.562 POSTOPERATIVE PAIN OF LEFT KNEE: Primary | ICD-10-CM

## 2025-06-30 PROCEDURE — 99212 OFFICE O/P EST SF 10 MIN: CPT | Performed by: ORTHOPAEDIC SURGERY

## 2025-06-30 PROCEDURE — 73562 X-RAY EXAM OF KNEE 3: CPT | Mod: LT

## 2025-06-30 PROCEDURE — 73562 X-RAY EXAM OF KNEE 3: CPT | Mod: LEFT SIDE | Performed by: ORTHOPAEDIC SURGERY

## 2025-06-30 PROCEDURE — 99024 POSTOP FOLLOW-UP VISIT: CPT | Performed by: ORTHOPAEDIC SURGERY

## 2025-06-30 NOTE — PROGRESS NOTES
Chief Complaint   Patient presents with    Left Knee - Post-op     TKA 4-28-25  Xrays today       The patient is here for follow-up of their side: left knee arthroplasty.  The patient has mild knee pain.  The patient has no mechanical symptoms.  The patient has mild swelling.  The patient is approximately 9 week(s) postop  She was placed on antibiotics for suture abscess over her incision.  This is well-healed now.    Physical examination:    Examination of the side: left knee  The incision is healing well  No erythema or warmth.  No instability varus or valgus stressing the knee at 0, 30 or 60 degrees.  No instability in the AP plane at 90 degrees.  Range of motion: 0 degrees extension, 120 degrees flexion  There is mild tenderness  Calf is soft, Homans negative  The patient has intact ankle dorsiflexion and plantarflexion.    Radiographs:   XR knee left 3 views  Interpreted By:  Christopher Hollis,   STUDY:  XR KNEE LEFT 3 VIEWS; ; 6/30/2025 9:00 am      INDICATION:  Signs/Symptoms:pain.      ACCESSION NUMBER(S):  [id number]      ORDERING CLINICIAN:  CHRISTOPHER HOLLIS      FINDINGS:  Left knee films show a total knee arthroplasty in satisfactory  position. The patella is well positioned. No fracture is identified.  No obvious loosening or wear is appreciated.          Signed by: Christopher Hollis 6/30/2025 9:03 AM  Dictation workstation:   [id number]        Impression:  Status post side: left total knee arthroplasty    Plan:  Discussed the importance of prophylactic dental antibiotics  Return to work on August 3, 2025  Follow up in 4 months for recheck and if there is any problem  All questions answered  All questions answered

## 2025-06-30 NOTE — LETTER
June 30, 2025     Patient: Kindra Fuentes   YOB: 1975   Date of Visit: 6/30/2025       To Whom It May Concern:    It is my medical opinion that Kindra Fuentes may return to work on 08-04-25.  She may return full duty, no restrictions.  If you have any questions or concerns, please don't hesitate to call.         Sincerely,        Christopher Hollis MD    CC: No Recipients

## 2025-07-02 ENCOUNTER — TREATMENT (OUTPATIENT)
Dept: PHYSICAL THERAPY | Facility: HOSPITAL | Age: 50
End: 2025-07-02
Payer: COMMERCIAL

## 2025-07-02 DIAGNOSIS — M25.562 POSTOPERATIVE PAIN OF LEFT KNEE: Primary | ICD-10-CM

## 2025-07-02 DIAGNOSIS — G89.18 POSTOPERATIVE PAIN OF LEFT KNEE: Primary | ICD-10-CM

## 2025-07-02 PROCEDURE — 97110 THERAPEUTIC EXERCISES: CPT | Mod: GP,CQ

## 2025-07-02 ASSESSMENT — PAIN - FUNCTIONAL ASSESSMENT: PAIN_FUNCTIONAL_ASSESSMENT: 0-10

## 2025-07-02 ASSESSMENT — PAIN SCALES - GENERAL: PAINLEVEL_OUTOF10: 0 - NO PAIN

## 2025-07-02 NOTE — PROGRESS NOTES
"Physical Therapy Treatment    Patient Name: Kindra Fuentes  MRN: [id number]  Today's Date: 7/2/2025                         Current Problem  1. Postoperative pain of left knee  Follow Up In Physical Therapy          Insurance    ANTHEM  COPAY 0   30V COMBO PT OT [facility]   APPROVED PT 12V 05/20/25 - 07/20/25   Visit # 8/12      Subjective   Pt stating sleeping is her biggest challenge. Has a return to work date for August 3.     Pain  Pain Assessment: 0-10  0-10 (Numeric) Pain Score: 0 - No pain      Objective   L knee AROM -2,111 AAROM 116  Treatments:  NuStep, Seat 8, L4 x8 minutes  Step ups F/L 6in x15  Stairs reciprocal x2   TKE with ball at wall 5\" x15  3 way hip RTB 2x10  Side stepping/monster walks RTB x2L    PB wall squats 2x10  LAQ 2# 2x10  Bridges 2x15  SLR flex/AB 1# 2x10    Assessment:  Pt ambulating with slight antalgic gait pattern no assistive device. Progressed step height with step ups and wt with LAQ. Able to ascend/descend stairs in a reciprocal pattern but requires cues to keep hips square with descending. Pt displays improve AROM and AAROM with flexion.     Plan:  Cont to progress quad strength   "

## 2025-07-07 ENCOUNTER — TREATMENT (OUTPATIENT)
Dept: PHYSICAL THERAPY | Facility: HOSPITAL | Age: 50
End: 2025-07-07
Payer: COMMERCIAL

## 2025-07-07 DIAGNOSIS — M25.562 POSTOPERATIVE PAIN OF LEFT KNEE: Primary | ICD-10-CM

## 2025-07-07 DIAGNOSIS — G89.18 POSTOPERATIVE PAIN OF LEFT KNEE: Primary | ICD-10-CM

## 2025-07-07 PROCEDURE — 97110 THERAPEUTIC EXERCISES: CPT | Mod: GP,CQ

## 2025-07-07 ASSESSMENT — PAIN SCALES - GENERAL: PAINLEVEL_OUTOF10: 0 - NO PAIN

## 2025-07-07 NOTE — PROGRESS NOTES
Physical Therapy Treatment  7/7/2025  Patient Name: Kindra Fuentes  MRN: [id number]  Current Problem  1. Postoperative pain of left knee  Follow Up In Physical Therapy        Insurance    ANTHEM  COPAY 0   30V COMBO PT OT [facility]   APPROVED PT 12V 05/20/25 - 07/20/25   Visit # 9/12  Subjective   Pt with sensitivity issues along incision site, struggling to sleep. Otherwise, doing quite well to this point.  Precautions  Precautions Comment: none  0-10 (Numeric) Pain Score: 0 - No pain  Objective  Pt ambulating IND with minimal antalgia. Does use can from time to time, long walks and at night.  Treatments  NuStep, Seat 6, L5, 8 minutes  Squats, RTB, 30 reps  Standing hip 3-way, RTB, 10 reps x 2  Sidesteps and monster walks, RTB, 4 laps  Step ups F/L, 6 in, 20 reps  Reverse step ups, 6 in, 10 reps x 2  Heel taps, 4 in, 8 reps x 3  HR/TR, 1/2 roll, 30 reps  SLS's, blue foam pad, 20 sec holds, 4 reps  LAQ's, 20 reps x 2, 2-3 sec holds    Assessment  Pt continues to tolerate treatment well. Able to progress with introduction step height, reverse and heel taps from step. Still some quad and glute weakness, but is improving. Some slight quad lag remains LAQ. Reviewed HEP.    Plan:  [1] Quad strength and functional mobility  [2]  [3]  [4]    OP EDUCATION:    Goals:       Time Calculation  Start Time: 0742  Stop Time: 0825  Time Calculation (min): 43 min  PT Therapeutic Procedures Time Entry  Therapeutic Exercise Time Entry: 40

## 2025-07-09 ENCOUNTER — TREATMENT (OUTPATIENT)
Dept: PHYSICAL THERAPY | Facility: HOSPITAL | Age: 50
End: 2025-07-09
Payer: COMMERCIAL

## 2025-07-09 DIAGNOSIS — M25.562 POSTOPERATIVE PAIN OF LEFT KNEE: Primary | ICD-10-CM

## 2025-07-09 DIAGNOSIS — G89.18 POSTOPERATIVE PAIN OF LEFT KNEE: Primary | ICD-10-CM

## 2025-07-09 PROCEDURE — 97140 MANUAL THERAPY 1/> REGIONS: CPT | Mod: GP,CQ

## 2025-07-09 PROCEDURE — 97110 THERAPEUTIC EXERCISES: CPT | Mod: GP,CQ

## 2025-07-09 ASSESSMENT — PAIN - FUNCTIONAL ASSESSMENT: PAIN_FUNCTIONAL_ASSESSMENT: 0-10

## 2025-07-09 ASSESSMENT — PAIN SCALES - GENERAL: PAINLEVEL_OUTOF10: 0 - NO PAIN

## 2025-07-09 NOTE — PROGRESS NOTES
Physical Therapy Treatment  7/9/2025  Patient Name: Kindra Fuentes  MRN: [id number]  Current Problem  1. Postoperative pain of left knee  Follow Up In Physical Therapy        Insurance    [employer]  12v 5/20/25-7/20/25  Visit 10  Subjective   Pt still with some slight tightness incision, but doing well overall. Sleep greatest issue at this time.  Precautions  Precautions Comment: none  Pain Assessment: 0-10  0-10 (Numeric) Pain Score: 0 - No pain  Objective  Pt walking IND with minimal antalgia.    Treatments  NuStep, L5, 10 minutes  Squats, 30 reps  HR/TR, 1/2 roll, 30 reps  Standing hip 3-way, GTB, 15 reps  Sidesteps and monster walks, GTB  Reverse step ups, 6 in  Step ups F/L, 8 in, 20 reps   Heel taps, 4 in, 10 reps x 2  LAQ's, 3 lbs, 30 reps, 2-3 sec holds  SLS's, blue foam pad, 20 sec holds, 4 reps    Manual  STM distal quad  Grade 1-2 patellar/tibial mobs  PROM  Assessment  Pt continues to tolerate treatment extremely well. Denies pain with ex's, but quick muscular fatigue noted. ROM improving nicely, a bit pain limited end range flexion. Reduced with MTT. Reviewed HEP, continued focus on progressing muscular and functional endurance.   Plan:  [1] Progress Strength quad and glute  [2]  [3]  [4]    OP EDUCATION:    Goals:       Time Calculation  Start Time: 0742  Stop Time: 0828  Time Calculation (min): 46 min  PT Therapeutic Procedures Time Entry  Therapeutic Exercise Time Entry: 35  Manual Therapy Time Entry: 10

## 2025-07-14 ENCOUNTER — APPOINTMENT (OUTPATIENT)
Dept: PHYSICAL THERAPY | Facility: HOSPITAL | Age: 50
End: 2025-07-14
Payer: COMMERCIAL

## 2025-07-14 ENCOUNTER — TELEPHONE (OUTPATIENT)
Dept: PHYSICAL THERAPY | Facility: HOSPITAL | Age: 50
End: 2025-07-14
Payer: COMMERCIAL

## 2025-07-14 DIAGNOSIS — G89.18 POSTOPERATIVE PAIN OF LEFT KNEE: Primary | ICD-10-CM

## 2025-07-14 DIAGNOSIS — M25.562 POSTOPERATIVE PAIN OF LEFT KNEE: Primary | ICD-10-CM

## 2025-07-14 NOTE — TELEPHONE ENCOUNTER
[facility] APPT CX FOR TODAY'S 07/14 F/U APPTS SCHED Cancel Comment: I'm sorry, I have a zoom meeting for work at 8:00, I was originally going to see if I could leave early but with the change in time, it's just not going to work. PC MADE TO PT & LMOM TO ADV OPEN SLOTS AVAIL AT THIS TIME OF:  TUES 07/22 @ 11:30A  FRI 07/25 @ 8:15A, 10:454A  MON 07/28 @ 9A

## 2025-07-17 ENCOUNTER — TREATMENT (OUTPATIENT)
Dept: PHYSICAL THERAPY | Facility: HOSPITAL | Age: 50
End: 2025-07-17
Payer: COMMERCIAL

## 2025-07-17 DIAGNOSIS — M25.562 POSTOPERATIVE PAIN OF LEFT KNEE: Primary | ICD-10-CM

## 2025-07-17 DIAGNOSIS — G89.18 POSTOPERATIVE PAIN OF LEFT KNEE: Primary | ICD-10-CM

## 2025-07-17 PROCEDURE — 97110 THERAPEUTIC EXERCISES: CPT | Mod: GP

## 2025-07-17 NOTE — PROGRESS NOTES
[facility] Outpatient Physical Therapy    Physical Therapy Treatment Note    Patient Name: Kindra Fuentes  Primary Language: English  MRN: [id number]  Today's Date: 7/17/2025  Problem List Items Addressed This Visit           ICD-10-CM    Postoperative pain of left knee - Primary G89.18, M25.562           Christopher Hollis MD    Insurance:  Visit number: 11 of 12  Insurance Type: Payor:  EMPLOYEE [employer]AN / Plan:  EMPLOYEE [employer]ICAL PLAN [employer] / Product Type: *No Product type* /     Pain:  0/10  Location: left Knee  Discription: tightness    Treatment:  NuStep, L5, 8minutes      Squats, 30 reps  HR/TR, 1/2 roll, 30 reps  Standing hip 3-way, GTB, 15 reps  Sidesteps and monster walks, GTB  Reverse step ups, 6 in  Step ups F/L, 8 in, 20 reps   Heel taps, 4 in, 10 reps x 2  LAQ's, 3 lbs, 30 reps, 2-3 sec holds  SLS's, blue foam pad, 20 sec holds, 4 reps    Assessment: AROM with [id number], Hip ABD=4/5 B/L, SLS 10sec 1:3, no pain x3days LEFS=62/80    Plan:   Place patient on 30 day hold to ensure successful transition to home exercise program.  If pt does not return in 30 days this will serve as patient's discharge summary    Education:  Patient required verbal cues for proper form/technique    Goals:  Short Term Goals:  3 Visits   Pt to be independent and compliant with home exercise program to promote strength, range of motion, balance/prioprioception and improved posture.-MET  Pt to report worst pain 2/10 for 24 hours to improve activities of daily living tolerance.  Pt to improve ROM of left Knee to 100degrees to improve car transfers.-MET     Long Term Goals:    12 Visits  Pt to improve LEFS from 39/80 to 59/80 to improve ADL prem-MET  Pt to have 4/5 strength of Bilateral Hip ABD to improve balance and stair management.-MET  Pt to maintain SLS 10sec 2:3 trials to improve amb and stair management-Partially met  Pt to report worst pain 1/10 x3 for 3 consecutive days to improve ADL  prem-MET  Pt to have 120deg of left Knee flexion to improve amb prem.-MET    Billing:  Time Calculation  Start Time: 0730  Stop Time: 0815  Time Calculation (min): 45 min     PT Therapeutic Procedures Time Entry  Therapeutic Exercise Time Entry: 45

## 2025-07-22 ENCOUNTER — APPOINTMENT (OUTPATIENT)
Dept: ORTHOPEDIC SURGERY | Facility: CLINIC | Age: 50
End: 2025-07-22
Payer: COMMERCIAL

## 2025-07-23 DIAGNOSIS — Z12.31 ENCOUNTER FOR SCREENING MAMMOGRAM FOR BREAST CANCER: ICD-10-CM

## 2025-07-24 ENCOUNTER — TELEPHONE (OUTPATIENT)
Dept: PRIMARY CARE | Facility: CLINIC | Age: 50
End: 2025-07-24

## 2025-07-24 ENCOUNTER — APPOINTMENT (OUTPATIENT)
Dept: PRIMARY CARE | Facility: CLINIC | Age: 50
End: 2025-07-24
Payer: COMMERCIAL

## 2025-07-24 VITALS
SYSTOLIC BLOOD PRESSURE: 142 MMHG | HEIGHT: 63 IN | HEART RATE: 72 BPM | BODY MASS INDEX: 33.31 KG/M2 | OXYGEN SATURATION: 95 % | TEMPERATURE: 98 F | DIASTOLIC BLOOD PRESSURE: 92 MMHG | WEIGHT: 188 LBS | RESPIRATION RATE: 16 BRPM

## 2025-07-24 DIAGNOSIS — Z12.31 ENCOUNTER FOR SCREENING MAMMOGRAM FOR MALIGNANT NEOPLASM OF BREAST: ICD-10-CM

## 2025-07-24 DIAGNOSIS — Z00.00 HEALTH MAINTENANCE EXAMINATION: ICD-10-CM

## 2025-07-24 DIAGNOSIS — Z00.00 HEALTH CARE MAINTENANCE: Primary | ICD-10-CM

## 2025-07-24 DIAGNOSIS — F32.1 MAJOR DEPRESSIVE DISORDER, SINGLE EPISODE, MODERATE (MULTI): ICD-10-CM

## 2025-07-24 DIAGNOSIS — R68.89: ICD-10-CM

## 2025-07-24 DIAGNOSIS — I10 ESSENTIAL HYPERTENSION: ICD-10-CM

## 2025-07-24 PROBLEM — G89.18 POSTOPERATIVE PAIN OF LEFT KNEE: Status: RESOLVED | Noted: 2025-05-20 | Resolved: 2025-07-24

## 2025-07-24 PROBLEM — M25.562 POSTOPERATIVE PAIN OF LEFT KNEE: Status: RESOLVED | Noted: 2025-05-20 | Resolved: 2025-07-24

## 2025-07-24 PROCEDURE — 99396 PREV VISIT EST AGE 40-64: CPT | Performed by: INTERNAL MEDICINE

## 2025-07-24 PROCEDURE — 3077F SYST BP >= 140 MM HG: CPT | Performed by: INTERNAL MEDICINE

## 2025-07-24 PROCEDURE — 3008F BODY MASS INDEX DOCD: CPT | Performed by: INTERNAL MEDICINE

## 2025-07-24 PROCEDURE — 3080F DIAST BP >= 90 MM HG: CPT | Performed by: INTERNAL MEDICINE

## 2025-07-24 PROCEDURE — 90471 IMMUNIZATION ADMIN: CPT | Performed by: INTERNAL MEDICINE

## 2025-07-24 PROCEDURE — 90750 HZV VACC RECOMBINANT IM: CPT | Performed by: INTERNAL MEDICINE

## 2025-07-24 RX ORDER — LOSARTAN POTASSIUM 50 MG/1
25 TABLET ORAL DAILY
Qty: 90 TABLET | Refills: 3 | Status: SHIPPED | OUTPATIENT
Start: 2025-07-24 | End: 2026-07-23

## 2025-07-24 NOTE — PROGRESS NOTES
"Subjective   Kindra Fuentes is a 50 y.o. female who presents for Annual Exam.    HPI   Influenza 2024  Covid 2020, 2021  Shingrix   Tdap 2024  Mammogram   Pap 2017 GYN  Cologuard 2023  Bmi 33  Depression screen 25  Eye exam 25 [facility]      Review of Systems   Constitutional:  Negative for fatigue, fever and unexpected weight change.   HENT:  Negative for congestion, ear pain, rhinorrhea and sore throat.    Eyes:  Negative for pain, redness and itching.   Respiratory:  Negative for cough, shortness of breath and wheezing.    Cardiovascular:  Negative for chest pain, palpitations and leg swelling.   Gastrointestinal:  Negative for abdominal pain, constipation, diarrhea and nausea.   Genitourinary:  Negative for difficulty urinating, dysuria and frequency.   Musculoskeletal:  Negative for arthralgias and back pain.   Allergic/Immunologic: Negative for environmental allergies, food allergies and immunocompromised state.   Neurological:  Negative for weakness and headaches.   Psychiatric/Behavioral: Negative.     All other systems reviewed and are negative.      Health Maintenance Due   Topic Date Due    HIV Screening  Never done    MMR Vaccines (1 of 1 - Standard series) Never done    Hepatitis C Screening  Never done    Pneumococcal Vaccine (1 of 2 - PCV) Never done    Mammogram  Never done    Cervical Cancer Screening  08/18/2020    COVID-19 Vaccine (4 - 2024-25 season) 09/01/2024    Yearly Adult Physical  08/02/2025       Objective   BP (!) 142/92   Pulse 72   Temp 36.7 °C (98 °F)   Resp 16   Ht 1.6 m (5' 3\")   Wt 85.3 kg (188 lb)   SpO2 95%   BMI 33.30 kg/m²     Physical Exam  Vitals and nursing note reviewed.   Constitutional:       Appearance: Normal appearance.   HENT:      Head: Normocephalic.     Eyes:      Conjunctiva/sclera: Conjunctivae normal.      Pupils: Pupils are equal, round, and reactive to light.       Cardiovascular:      Rate and Rhythm: Normal rate and regular rhythm.      Pulses: " Normal pulses.      Heart sounds: Normal heart sounds.   Pulmonary:      Effort: Pulmonary effort is normal.      Breath sounds: Normal breath sounds.     Musculoskeletal:         General: No swelling.      Cervical back: Neck supple.     Skin:     General: Skin is warm and dry.     Neurological:      General: No focal deficit present.      Mental Status: She is oriented to person, place, and time.         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

## 2025-07-25 ASSESSMENT — ENCOUNTER SYMPTOMS
WHEEZING: 0
CONSTIPATION: 0
COUGH: 0
SORE THROAT: 0
FATIGUE: 0
WEAKNESS: 0
ARTHRALGIAS: 0
RHINORRHEA: 0
BACK PAIN: 0
FREQUENCY: 0
FEVER: 0
DIFFICULTY URINATING: 0
HEADACHES: 0
UNEXPECTED WEIGHT CHANGE: 0
EYE PAIN: 0
ABDOMINAL PAIN: 0
NAUSEA: 0
DIARRHEA: 0
SHORTNESS OF BREATH: 0
EYE ITCHING: 0
PALPITATIONS: 0
PSYCHIATRIC NEGATIVE: 1
DYSURIA: 0
EYE REDNESS: 0

## 2025-07-29 ENCOUNTER — APPOINTMENT (OUTPATIENT)
Dept: ORTHOPEDIC SURGERY | Facility: CLINIC | Age: 50
End: 2025-07-29
Payer: COMMERCIAL

## 2025-07-29 ENCOUNTER — TELEPHONE (OUTPATIENT)
Dept: PRIMARY CARE | Facility: CLINIC | Age: 50
End: 2025-07-29

## 2025-07-29 NOTE — PROGRESS NOTES
This writer spoke with patient about collaborative care. Patient is unsure if they are able to do it and are unsure what their work schedule is since they are out on leave currently and return back to work next week. This writer will follow up next week.

## 2025-08-07 ENCOUNTER — TELEPHONE (OUTPATIENT)
Dept: PRIMARY CARE | Facility: CLINIC | Age: 50
End: 2025-08-07
Payer: COMMERCIAL

## 2025-08-18 ENCOUNTER — TELEPHONE (OUTPATIENT)
Dept: PRIMARY CARE | Facility: CLINIC | Age: 50
End: 2025-08-18
Payer: COMMERCIAL

## 2025-08-25 ENCOUNTER — TELEPHONE (OUTPATIENT)
Dept: PRIMARY CARE | Facility: CLINIC | Age: 50
End: 2025-08-25
Payer: COMMERCIAL

## 2025-09-05 DIAGNOSIS — J45.20 MILD INTERMITTENT ASTHMATIC BRONCHITIS WITHOUT COMPLICATION (HHS-HCC): ICD-10-CM

## 2025-09-05 RX ORDER — FLUTICASONE PROPIONATE AND SALMETEROL 250; 50 UG/1; UG/1
1 POWDER RESPIRATORY (INHALATION) 2 TIMES DAILY
Qty: 60 EACH | Refills: 11 | Status: SHIPPED | OUTPATIENT
Start: 2025-09-05

## 2025-10-28 ENCOUNTER — APPOINTMENT (OUTPATIENT)
Dept: ORTHOPEDIC SURGERY | Facility: CLINIC | Age: 50
End: 2025-10-28
Payer: COMMERCIAL